# Patient Record
Sex: FEMALE | Race: WHITE | NOT HISPANIC OR LATINO | URBAN - METROPOLITAN AREA
[De-identification: names, ages, dates, MRNs, and addresses within clinical notes are randomized per-mention and may not be internally consistent; named-entity substitution may affect disease eponyms.]

---

## 2018-01-31 ENCOUNTER — EMERGENCY (EMERGENCY)
Facility: HOSPITAL | Age: 68
LOS: 0 days | Discharge: HOME | End: 2018-02-01
Admitting: FAMILY MEDICINE

## 2018-01-31 DIAGNOSIS — E87.6 HYPOKALEMIA: ICD-10-CM

## 2018-01-31 DIAGNOSIS — Z79.891 LONG TERM (CURRENT) USE OF OPIATE ANALGESIC: ICD-10-CM

## 2018-01-31 DIAGNOSIS — Z90.710 ACQUIRED ABSENCE OF BOTH CERVIX AND UTERUS: ICD-10-CM

## 2018-01-31 DIAGNOSIS — Z79.899 OTHER LONG TERM (CURRENT) DRUG THERAPY: ICD-10-CM

## 2018-01-31 DIAGNOSIS — J18.9 PNEUMONIA, UNSPECIFIED ORGANISM: ICD-10-CM

## 2018-01-31 DIAGNOSIS — Z98.890 OTHER SPECIFIED POSTPROCEDURAL STATES: ICD-10-CM

## 2018-01-31 DIAGNOSIS — I10 ESSENTIAL (PRIMARY) HYPERTENSION: ICD-10-CM

## 2018-01-31 DIAGNOSIS — R53.1 WEAKNESS: ICD-10-CM

## 2019-02-11 ENCOUNTER — OUTPATIENT (OUTPATIENT)
Dept: OUTPATIENT SERVICES | Facility: HOSPITAL | Age: 69
LOS: 1 days | Discharge: HOME | End: 2019-02-11

## 2019-02-11 VITALS
TEMPERATURE: 95 F | DIASTOLIC BLOOD PRESSURE: 66 MMHG | HEIGHT: 66 IN | HEART RATE: 71 BPM | RESPIRATION RATE: 18 BRPM | SYSTOLIC BLOOD PRESSURE: 124 MMHG | WEIGHT: 200.4 LBS | OXYGEN SATURATION: 97 %

## 2019-02-11 DIAGNOSIS — Z01.818 ENCOUNTER FOR OTHER PREPROCEDURAL EXAMINATION: ICD-10-CM

## 2019-02-11 DIAGNOSIS — Z90.710 ACQUIRED ABSENCE OF BOTH CERVIX AND UTERUS: Chronic | ICD-10-CM

## 2019-02-11 DIAGNOSIS — M54.5 LOW BACK PAIN: ICD-10-CM

## 2019-02-11 DIAGNOSIS — Z98.1 ARTHRODESIS STATUS: Chronic | ICD-10-CM

## 2019-02-11 DIAGNOSIS — Z96.653 PRESENCE OF ARTIFICIAL KNEE JOINT, BILATERAL: Chronic | ICD-10-CM

## 2019-02-11 DIAGNOSIS — Z90.49 ACQUIRED ABSENCE OF OTHER SPECIFIED PARTS OF DIGESTIVE TRACT: Chronic | ICD-10-CM

## 2019-02-11 LAB
ALBUMIN SERPL ELPH-MCNC: 4.3 G/DL — SIGNIFICANT CHANGE UP (ref 3.5–5.2)
ALP SERPL-CCNC: 84 U/L — SIGNIFICANT CHANGE UP (ref 30–115)
ALT FLD-CCNC: 17 U/L — SIGNIFICANT CHANGE UP (ref 0–41)
ANION GAP SERPL CALC-SCNC: 20 MMOL/L — HIGH (ref 7–14)
AST SERPL-CCNC: 20 U/L — SIGNIFICANT CHANGE UP (ref 0–41)
BILIRUB SERPL-MCNC: 0.3 MG/DL — SIGNIFICANT CHANGE UP (ref 0.2–1.2)
BUN SERPL-MCNC: 24 MG/DL — HIGH (ref 10–20)
CALCIUM SERPL-MCNC: 9.9 MG/DL — SIGNIFICANT CHANGE UP (ref 8.5–10.1)
CHLORIDE SERPL-SCNC: 93 MMOL/L — LOW (ref 98–110)
CO2 SERPL-SCNC: 30 MMOL/L — SIGNIFICANT CHANGE UP (ref 17–32)
CREAT SERPL-MCNC: 1 MG/DL — SIGNIFICANT CHANGE UP (ref 0.7–1.5)
GLUCOSE SERPL-MCNC: 92 MG/DL — SIGNIFICANT CHANGE UP (ref 70–99)
POTASSIUM SERPL-MCNC: 3.4 MMOL/L — LOW (ref 3.5–5)
POTASSIUM SERPL-SCNC: 3.4 MMOL/L — LOW (ref 3.5–5)
PROT SERPL-MCNC: 6.7 G/DL — SIGNIFICANT CHANGE UP (ref 6–8)
SODIUM SERPL-SCNC: 143 MMOL/L — SIGNIFICANT CHANGE UP (ref 135–146)

## 2019-02-11 NOTE — H&P PST ADULT - PSH
H/O spinal fusion  2012  H/O total knee replacement, bilateral  2010  History of cholecystectomy  2008  History of hysterectomy  2002

## 2019-02-11 NOTE — H&P PST ADULT - REASON FOR ADMISSION
68 Y/O F SCHEDULED FOR PAST FOR PERMANENT PLACEMENT OF THORACIC SPINAL CORD STIMULATOR AND IPG BATTERY PLACEMENT ON 2/15/19

## 2019-02-11 NOTE — H&P PST ADULT - HISTORY OF PRESENT ILLNESS
CURRENTLY  DENIES ANY CP, SOB,PALPITATIONS,COUGH OR DYSURIA  EXERCISE TOLERANCE 1 FOS WITHOUT SOB- uses cane for stabiltiy  chronic sinus congestion    AS PER PATIENT  this is his/her complete medical history including medications - PRESCRIPTIONS  OVER THE COUNTER MEDS

## 2019-02-15 ENCOUNTER — OUTPATIENT (OUTPATIENT)
Dept: OUTPATIENT SERVICES | Facility: HOSPITAL | Age: 69
LOS: 1 days | Discharge: HOME | End: 2019-02-15

## 2019-02-15 VITALS
HEART RATE: 97 BPM | DIASTOLIC BLOOD PRESSURE: 67 MMHG | RESPIRATION RATE: 15 BRPM | SYSTOLIC BLOOD PRESSURE: 146 MMHG | OXYGEN SATURATION: 96 %

## 2019-02-15 VITALS
RESPIRATION RATE: 140 BRPM | HEART RATE: 73 BPM | HEIGHT: 66 IN | TEMPERATURE: 98 F | SYSTOLIC BLOOD PRESSURE: 61 MMHG | WEIGHT: 190.04 LBS

## 2019-02-15 DIAGNOSIS — Z96.653 PRESENCE OF ARTIFICIAL KNEE JOINT, BILATERAL: Chronic | ICD-10-CM

## 2019-02-15 DIAGNOSIS — Z98.1 ARTHRODESIS STATUS: Chronic | ICD-10-CM

## 2019-02-15 DIAGNOSIS — M54.5 LOW BACK PAIN: ICD-10-CM

## 2019-02-15 DIAGNOSIS — Z90.49 ACQUIRED ABSENCE OF OTHER SPECIFIED PARTS OF DIGESTIVE TRACT: Chronic | ICD-10-CM

## 2019-02-15 DIAGNOSIS — Z90.710 ACQUIRED ABSENCE OF BOTH CERVIX AND UTERUS: Chronic | ICD-10-CM

## 2019-02-15 RX ORDER — HYDROMORPHONE HYDROCHLORIDE 2 MG/ML
1 INJECTION INTRAMUSCULAR; INTRAVENOUS; SUBCUTANEOUS
Qty: 0 | Refills: 0 | Status: DISCONTINUED | OUTPATIENT
Start: 2019-02-15 | End: 2019-02-16

## 2019-02-15 RX ORDER — HYDROMORPHONE HYDROCHLORIDE 2 MG/ML
0.5 INJECTION INTRAMUSCULAR; INTRAVENOUS; SUBCUTANEOUS
Qty: 0 | Refills: 0 | Status: DISCONTINUED | OUTPATIENT
Start: 2019-02-15 | End: 2019-02-16

## 2019-02-15 RX ORDER — SODIUM CHLORIDE 9 MG/ML
1000 INJECTION, SOLUTION INTRAVENOUS
Qty: 0 | Refills: 0 | Status: DISCONTINUED | OUTPATIENT
Start: 2019-02-15 | End: 2019-02-16

## 2019-02-15 RX ORDER — ONDANSETRON 8 MG/1
4 TABLET, FILM COATED ORAL ONCE
Qty: 0 | Refills: 0 | Status: DISCONTINUED | OUTPATIENT
Start: 2019-02-15 | End: 2019-02-16

## 2019-02-15 RX ORDER — MEPERIDINE HYDROCHLORIDE 50 MG/ML
12.5 INJECTION INTRAMUSCULAR; INTRAVENOUS; SUBCUTANEOUS
Qty: 0 | Refills: 0 | Status: DISCONTINUED | OUTPATIENT
Start: 2019-02-15 | End: 2019-02-16

## 2019-02-15 RX ADMIN — SODIUM CHLORIDE 100 MILLILITER(S): 9 INJECTION, SOLUTION INTRAVENOUS at 22:07

## 2019-02-15 NOTE — ASU DISCHARGE PLAN (ADULT/PEDIATRIC). - MEDICATION SUMMARY - MEDICATIONS TO TAKE
I will START or STAY ON the medications listed below when I get home from the hospital:    morphine 30 mg oral capsule  -- orally 2 times a day  -- Indication: For pain    gabapentin 300 mg oral tablet  -- orally 3 times a day  -- Indication: For nerve pain    triamterene-hydrochlorothiazide 75mg-50mg oral tablet  -- 1 tab(s) by mouth once a day  -- Indication: For HTN    Metoprolol Tartrate 25 mg oral tablet  -- orally once a day  -- Indication: For HTN

## 2019-02-15 NOTE — CHART NOTE - NSCHARTNOTEFT_GEN_A_CORE
PACU ANESTHESIA ADMISSION NOTE      Procedure:   Post op diagnosis:      ____  Intubated  TV:______       Rate: ______      FiO2: ______    __x_  Patent Airway    ____  Full return of protective reflexes    _x___  Full recovery from anesthesia / back to baseline status    Vitals:  T(F): 98.1  HR 98  BP: 140/71  RR: 14  SpO2: 100%    Mental Status:  ___x_ Awake   __x___ Alert   _____ Drowsy   _____ Sedated    Nausea/Vomiting:  __x__ NO  ______Yes,   See Post - Op Orders          Pain Scale (0-10):  _____    Treatment: ____ None    _x___ See Post - Op/PCA Orders    Post - Operative Fluids:   ____ Oral   ___x_ See Post - Op Orders    Plan: Discharge:   __x__Home       _____Floor     _____Critical Care    _____  Other:_________________    Comments: Patient tolerated procedure  No anesthesia complications  Transfer to PACU

## 2019-02-15 NOTE — PRE-ANESTHESIA EVALUATION ADULT - NSANTHADDINFOFT_GEN_ALL_CORE
R/B/A explained and accepted plan ADONAY R/B/A explained and accepted plan ADONAY  OLd record reviewed 2012 .  Glidescope view Gr2

## 2019-02-16 NOTE — BRIEF OPERATIVE NOTE - PROCEDURE
<<-----Click on this checkbox to enter Procedure Spinal cord stimulator replacement  02/16/2019  and right IPG  Active  ARAVAL1

## 2019-02-20 DIAGNOSIS — Z96.653 PRESENCE OF ARTIFICIAL KNEE JOINT, BILATERAL: ICD-10-CM

## 2019-02-20 DIAGNOSIS — M19.90 UNSPECIFIED OSTEOARTHRITIS, UNSPECIFIED SITE: ICD-10-CM

## 2019-02-20 DIAGNOSIS — M54.5 LOW BACK PAIN: ICD-10-CM

## 2019-02-20 DIAGNOSIS — I10 ESSENTIAL (PRIMARY) HYPERTENSION: ICD-10-CM

## 2022-06-20 PROBLEM — Z00.00 ENCOUNTER FOR PREVENTIVE HEALTH EXAMINATION: Status: ACTIVE | Noted: 2022-06-20

## 2022-08-11 PROBLEM — M19.90 UNSPECIFIED OSTEOARTHRITIS, UNSPECIFIED SITE: Chronic | Status: ACTIVE | Noted: 2019-02-11

## 2022-08-11 PROBLEM — I10 ESSENTIAL (PRIMARY) HYPERTENSION: Chronic | Status: ACTIVE | Noted: 2019-02-11

## 2022-08-11 PROBLEM — M54.9 DORSALGIA, UNSPECIFIED: Chronic | Status: ACTIVE | Noted: 2019-02-11

## 2022-09-13 ENCOUNTER — APPOINTMENT (OUTPATIENT)
Dept: PAIN MANAGEMENT | Facility: CLINIC | Age: 72
End: 2022-09-13

## 2022-09-13 VITALS
WEIGHT: 180 LBS | BODY MASS INDEX: 28.93 KG/M2 | HEART RATE: 86 BPM | HEIGHT: 66 IN | DIASTOLIC BLOOD PRESSURE: 67 MMHG | SYSTOLIC BLOOD PRESSURE: 102 MMHG

## 2022-09-13 DIAGNOSIS — Z86.39 PERSONAL HISTORY OF OTHER ENDOCRINE, NUTRITIONAL AND METABOLIC DISEASE: ICD-10-CM

## 2022-09-13 DIAGNOSIS — Z86.79 PERSONAL HISTORY OF OTHER DISEASES OF THE CIRCULATORY SYSTEM: ICD-10-CM

## 2022-09-13 PROCEDURE — 99213 OFFICE O/P EST LOW 20 MIN: CPT

## 2022-09-13 NOTE — DATA REVIEWED
[FreeTextEntry1] : LUMBAR X-RAY\par IMPRESSION:\par Degenerative change, L1-2 disc. Postop change, L2-S1 levels. No hardware complication. Demineralization. Degenerative change at L1-2 is new. The neurostimulator device is new.

## 2022-09-13 NOTE — HISTORY OF PRESENT ILLNESS
[FreeTextEntry1] : A continuing active encounter took place, previous history and exam reviewed.\par \par ORIGINAL PRESENTATION: Ms. Tenorio is a 72 year old woman who we have been following since 2011 for chronic low back pain associated with radiculopathy symptoms. On May 4th 2012, the patient underwent an L2-L3 and L3-L4 direct lateral interbody fusion along with an L4-L5 and L5-S1 transforaminal lumbar interbody fusion with posterior screw fixation. On Oct 30th, 2013, she had a fluoroscopically guided bilateral L5-S1 hardware block and reports about 70% relief of her pain symptoms. She underwent a hardware removal procedure in April 2014.\par \par TODAY: The reason for the visit is for a continuing active encounter. She is under our care for chronic lumbar pain. She recently recovered from Covid. She continues to use her SCS and is doing her own exercises at home.\par \par The patient continues with tizanidine and MS Contin 60 mg bid. She denies side effects from the medications. No aberrant behavior is noted.\par \par UDS, 6/9/22 - Consistent.

## 2022-09-13 NOTE — PHYSICAL EXAM
[de-identified] :  Neurologic: Her speech is clear and fluent. She answers questions appropriately.   Back, including spine: Palpation of the thoracic/lumbar spine is as follows: midline lumbar tenderness. Range of motion of the thoracic and lumbar spine is as follows: Diminished range of motion in all planes. Gait and function is as follows: patient ambulates without assistive device and mildly antalgic gait.

## 2022-09-13 NOTE — DISCUSSION/SUMMARY
[de-identified] : I have consulted the  registry for the purpose of reviewing the patient's controlled substance.\par \par Overall there is at least a 30-50% reduction in pain with the prescribed analgesics. The patient denies any adverse side effects due to the medication (sleeping disturbance, constipation, sleepiness, hallucinations and/or urination problems). \par \par There are no inconsistencies on urine toxicology screening which would necessitate an alteration of therapy.\par \par The patient will return to the office in 4 weeks time and is aware to contact me with any question or concerns in the interim.\par \par Bri Gill PA-C \par Dulce Rojas MD\par

## 2022-09-13 NOTE — ASSESSMENT
[FreeTextEntry1] : Ms. Tenorio suffers with chronic back pain. She will continue with her own exercises at home. She will continue with tizanidine and MS Contin. She will call in 4 weeks for her medication refills. I will see her back at the office in 8 weeks for reevaluation.

## 2022-11-08 ENCOUNTER — RX RENEWAL (OUTPATIENT)
Age: 72
End: 2022-11-08

## 2022-11-11 ENCOUNTER — LABORATORY RESULT (OUTPATIENT)
Age: 72
End: 2022-11-11

## 2022-11-11 ENCOUNTER — APPOINTMENT (OUTPATIENT)
Dept: PAIN MANAGEMENT | Facility: CLINIC | Age: 72
End: 2022-11-11

## 2022-11-11 VITALS — DIASTOLIC BLOOD PRESSURE: 87 MMHG | SYSTOLIC BLOOD PRESSURE: 150 MMHG | HEART RATE: 111 BPM

## 2022-11-11 LAB — OPIATES UR-MCNC: NORMAL

## 2022-11-11 PROCEDURE — 99213 OFFICE O/P EST LOW 20 MIN: CPT

## 2022-11-11 PROCEDURE — 80305 DRUG TEST PRSMV DIR OPT OBS: CPT | Mod: QW

## 2022-11-11 NOTE — ASSESSMENT
[FreeTextEntry1] : Ms. Tenorio suffers with chronic back pain. She will continue with her own exercises at home. She will start PT for her lumbar spine. She will continue with MS Contin. I have switched her to cyclobenzaprine 5 mg TID. Due to limited appointment availability, she will call in 4 weeks for her medication refills. I will see her back at the office in 8 weeks for reevaluation.

## 2022-11-11 NOTE — HISTORY OF PRESENT ILLNESS
[FreeTextEntry1] : A continuing active encounter took place, previous history and exam reviewed.\par \par ORIGINAL PRESENTATION: Ms. Tenorio is a 72 year old woman who we have been following since 2011 for chronic low back pain associated with radiculopathy symptoms. On May 4th 2012, the patient underwent an L2-L3 and L3-L4 direct lateral interbody fusion along with an L4-L5 and L5-S1 transforaminal lumbar interbody fusion with posterior screw fixation. On Oct 30th, 2013, she had a fluoroscopically guided bilateral L5-S1 hardware block and reports about 70% relief of her pain symptoms. She underwent a hardware removal procedure in April 2014.\par \par TODAY: The reason for the visit is for a continuing active encounter. She is under our care for chronic lumbar pain. She continues to use her SCS and is doing her own exercises at home. She is interested in starting PT.\par \par The patient continues with tizanidine and MS Contin 60 mg bid. She states the tizanidine has been causing her dizziness and because of this, she has been taking it very sparingly. We spoke about switching her to another muscle relaxant and she is agreeable. No aberrant behavior is noted.\par \par UDS, repeated today, 11/11/22

## 2022-11-11 NOTE — PHYSICAL EXAM
[de-identified] :  Neurologic: Her speech is clear and fluent. She answers questions appropriately.   Back, including spine: Palpation of the thoracic/lumbar spine is as follows: midline lumbar tenderness. Range of motion of the thoracic and lumbar spine is as follows: Diminished range of motion in all planes. Gait and function is as follows: patient ambulates without assistive device and mildly antalgic gait.

## 2022-11-11 NOTE — ASU PATIENT PROFILE, ADULT - BLOOD TRANSFUSION, PREVIOUS, PROFILE
ANTICOAGULATION MANAGEMENT     Renée Mcfadden 56 year old female is on warfarin with supratherapeutic INR result. (Goal INR 2.0-3.0)    Recent labs: (last 7 days)     11/11/22  0805   INR 3.5*       ASSESSMENT       Source(s): Chart Review and Patient/Caregiver Call       Warfarin doses taken: Warfarin taken as instructed    Diet: No new diet changes identified    New illness, injury, or hospitalization: No    Medication/supplement changes: None noted    Signs or symptoms of bleeding or clotting: No    Previous INR: Therapeutic last 2(+) visits    Additional findings: None       PLAN     Recommended plan for no diet, medication or health factor changes affecting INR     Dosing Instructions: partial hold then continue your current warfarin dose with next INR in 2 weeks       Summary  As of 11/11/2022    Full warfarin instructions:  11/11: 2.5 mg; Otherwise 5 mg every Mon, Wed, Fri; 7.5 mg all other days; Starting 11/11/2022   Next INR check:  11/25/2022             Telephone call with Geraldine who verbalizes understanding and agrees to plan    Lab visit scheduled    Education provided:     Goal range and lab monitoring: goal range and significance of current result    Plan made per [facility] anticoagulation protocol    Farrah Lopez RN  Anticoagulation Clinic  11/11/2022    _______________________________________________________________________     Anticoagulation Episode Summary     Current INR goal:  2.0-3.0   TTR:  69.1 % (8.1 mo)   Target end date:  Indefinite   Send INR reminders to:  DOUGIE RAVI    Indications    Personal history of DVT (deep vein thrombosis) [Z86.718]           Comments:           Anticoagulation Care Providers     Provider Role Specialty Phone number    Davidson Alegria MD Referring Family Medicine [phone]           no

## 2022-11-11 NOTE — DISCUSSION/SUMMARY
[de-identified] : I have consulted the  registry for the purpose of reviewing the patient's controlled substance.\par \par Overall there is at least a 30-50% reduction in pain with the prescribed analgesics. The patient denies any adverse side effects due to the medication (sleeping disturbance, constipation, sleepiness, hallucinations and/or urination problems). \par \par Urine drug screening collected today with rapid sample result consistent with given regimen. Sample to be sent for confirmatory testing with additional relief at a later time.\par \par The patient will return to the office in 4 weeks time and is aware to contact me with any question or concerns in the interim.\par \par Bri Gill PA-C \par Dulce Rojas MD\par

## 2023-01-12 ENCOUNTER — APPOINTMENT (OUTPATIENT)
Dept: PAIN MANAGEMENT | Facility: CLINIC | Age: 73
End: 2023-01-12
Payer: MEDICARE

## 2023-01-12 VITALS
HEIGHT: 66 IN | WEIGHT: 180 LBS | SYSTOLIC BLOOD PRESSURE: 142 MMHG | HEART RATE: 98 BPM | BODY MASS INDEX: 28.93 KG/M2 | DIASTOLIC BLOOD PRESSURE: 81 MMHG

## 2023-01-12 PROCEDURE — 99213 OFFICE O/P EST LOW 20 MIN: CPT

## 2023-01-12 NOTE — DISCUSSION/SUMMARY
[de-identified] : I have consulted the  registry for the purpose of reviewing the patient's controlled substance.\par \par Overall there is at least a 30-50% reduction in pain with the prescribed analgesics. The patient denies any adverse side effects due to the medication (sleeping disturbance, constipation, sleepiness, hallucinations and/or urination problems). \par \par There are no inconsistencies on urine toxicology screening which would necessitate an alteration of therapy.\par \par The patient will return to the office in 4 weeks time and is aware to contact me with any question or concerns in the interim.\par \par rBi Gill PA-C \par Dulce Rojas MD\par

## 2023-01-12 NOTE — ASSESSMENT
[FreeTextEntry1] : Ms. Tenorio suffers with chronic back pain. She will continue with her own exercises at home. She will start PT at [facility] for her lumbar spine. She will continue with MS Contin. I have prescribed her methocarbamol 750 mg TID prn. She will follow up in 4 weeks for reevaluation.

## 2023-01-12 NOTE — HISTORY OF PRESENT ILLNESS
[FreeTextEntry1] : A continuing active encounter took place, previous history and exam reviewed.\par \par ORIGINAL PRESENTATION: Ms. Tenorio is a 72 year old woman who we have been following since 2011 for chronic low back pain associated with radiculopathy symptoms. On May 4th 2012, the patient underwent an L2-L3 and L3-L4 direct lateral interbody fusion along with an L4-L5 and L5-S1 transforaminal lumbar interbody fusion with posterior screw fixation. On Oct 30th, 2013, she had a fluoroscopically guided bilateral L5-S1 hardware block and reports about 70% relief of her pain symptoms. She underwent a hardware removal procedure in April 2014.\par \par TODAY: The reason for the visit is for a continuing active encounter. She is under our care for chronic lumbar pain. She continues to use her SCS and is doing her own exercises at home. She did not start PT due to the holidays but plans to start in the near future.\par \par The patient continues with MS Contin 60 mg bid. She stopped taking tizanidine which was causing her dizziness. She was switched to cyclobenzaprine, which was not covered by her insurance company. I will prescribe methocarmabol for her today and see if it is covered.\par \par UDS, 11/11/22.\par SOAPP, done today, 1/12/23 - LOW RISK.

## 2023-01-12 NOTE — PHYSICAL EXAM
[de-identified] :  Neurologic: Her speech is clear and fluent. She answers questions appropriately.   Back, including spine: Palpation of the thoracic/lumbar spine is as follows: midline lumbar tenderness. Range of motion of the thoracic and lumbar spine is as follows: Diminished range of motion in all planes. Gait and function is as follows: patient ambulates without assistive device and mildly antalgic gait.

## 2023-02-14 ENCOUNTER — APPOINTMENT (OUTPATIENT)
Dept: PAIN MANAGEMENT | Facility: CLINIC | Age: 73
End: 2023-02-14
Payer: MEDICARE

## 2023-02-14 VITALS
SYSTOLIC BLOOD PRESSURE: 132 MMHG | WEIGHT: 180 LBS | HEART RATE: 85 BPM | HEIGHT: 66 IN | BODY MASS INDEX: 28.93 KG/M2 | DIASTOLIC BLOOD PRESSURE: 89 MMHG

## 2023-02-14 PROCEDURE — 99213 OFFICE O/P EST LOW 20 MIN: CPT

## 2023-02-14 RX ORDER — TIZANIDINE 4 MG/1
4 TABLET ORAL 3 TIMES DAILY
Qty: 90 | Refills: 1 | Status: DISCONTINUED | COMMUNITY
Start: 2022-09-13 | End: 2023-02-14

## 2023-02-14 RX ORDER — CYCLOBENZAPRINE HYDROCHLORIDE 5 MG/1
5 TABLET, FILM COATED ORAL 3 TIMES DAILY
Qty: 90 | Refills: 0 | Status: DISCONTINUED | COMMUNITY
Start: 2022-11-11 | End: 2023-02-14

## 2023-02-14 NOTE — DISCUSSION/SUMMARY
[de-identified] : I have consulted the  registry for the purpose of reviewing the patient's controlled substance.\par \par Overall there is at least a 30-50% reduction in pain with the prescribed analgesics. The patient denies any adverse side effects due to the medication (sleeping disturbance, constipation, sleepiness, hallucinations and/or urination problems). \par There are no inconsistencies on urine toxicology screening which would necessitate an alteration of therapy.\par The patient will return to the office in 4 weeks time and is aware to contact me with any question or concerns in the interim.\par \par Bri Gill PA-C \par Dulce Rojas MD\par

## 2023-02-14 NOTE — PHYSICAL EXAM
[de-identified] :  Neurologic: Her speech is clear and fluent. She answers questions appropriately.   Back, including spine: Palpation of the thoracic/lumbar spine is as follows: midline lumbar tenderness. Range of motion of the thoracic and lumbar spine is as follows: Diminished range of motion in all planes. Gait and function is as follows: patient ambulates without assistive device and mildly antalgic gait.

## 2023-02-14 NOTE — ASSESSMENT
[FreeTextEntry1] : Ms. Tenorio suffers with chronic back pain. Her symptoms have been relatively stable. She will continue with her own exercises at home. She will continue with MS Contin. She will follow up in 4 weeks for reevaluation.

## 2023-02-14 NOTE — HISTORY OF PRESENT ILLNESS
[FreeTextEntry1] : A continuing active encounter took place, previous history and exam reviewed.\par \par ORIGINAL PRESENTATION: Ms. Tenorio is a 73 year old woman who we have been following since 2011 for chronic low back pain associated with radiculopathy symptoms. On May 4th 2012, the patient underwent an L2-L3 and L3-L4 direct lateral interbody fusion along with an L4-L5 and L5-S1 transforaminal lumbar interbody fusion with posterior screw fixation. On Oct 30th, 2013, she had a fluoroscopically guided bilateral L5-S1 hardware block and reports about 70% relief of her pain symptoms. She underwent a hardware removal procedure in April 2014.\par \par TODAY: The reason for the visit is for a continuing active encounter. She is under our care for chronic lumbar pain. Her symptoms have remained stable over the past few weeks. She continues to use her SCS and is doing her own exercises at home. She is yet to start PT.\par \par The patient continues with MS Contin 60 mg bid. She stopped taking tizanidine which was causing her dizziness. She was switched to cyclobenzaprine, which was not covered by her insurance company. Methocarbamol was not approved either.\par \par UDS, 11/11/22.\par SOAPP, 1/12/23 - LOW RISK.

## 2023-03-05 ENCOUNTER — EMERGENCY (EMERGENCY)
Facility: HOSPITAL | Age: 73
LOS: 0 days | Discharge: HOME | End: 2023-03-05
Attending: EMERGENCY MEDICINE
Payer: MEDICARE

## 2023-03-05 VITALS
RESPIRATION RATE: 18 BRPM | DIASTOLIC BLOOD PRESSURE: 72 MMHG | TEMPERATURE: 99 F | HEART RATE: 71 BPM | SYSTOLIC BLOOD PRESSURE: 128 MMHG | OXYGEN SATURATION: 99 %

## 2023-03-05 VITALS
SYSTOLIC BLOOD PRESSURE: 134 MMHG | RESPIRATION RATE: 17 BRPM | OXYGEN SATURATION: 97 % | HEIGHT: 66 IN | WEIGHT: 169.98 LBS | DIASTOLIC BLOOD PRESSURE: 78 MMHG | TEMPERATURE: 99 F | HEART RATE: 78 BPM

## 2023-03-05 DIAGNOSIS — R30.0 DYSURIA: ICD-10-CM

## 2023-03-05 DIAGNOSIS — I10 ESSENTIAL (PRIMARY) HYPERTENSION: ICD-10-CM

## 2023-03-05 DIAGNOSIS — Z90.49 ACQUIRED ABSENCE OF OTHER SPECIFIED PARTS OF DIGESTIVE TRACT: Chronic | ICD-10-CM

## 2023-03-05 DIAGNOSIS — N12 TUBULO-INTERSTITIAL NEPHRITIS, NOT SPECIFIED AS ACUTE OR CHRONIC: ICD-10-CM

## 2023-03-05 DIAGNOSIS — Z90.49 ACQUIRED ABSENCE OF OTHER SPECIFIED PARTS OF DIGESTIVE TRACT: ICD-10-CM

## 2023-03-05 DIAGNOSIS — Z90.710 ACQUIRED ABSENCE OF BOTH CERVIX AND UTERUS: Chronic | ICD-10-CM

## 2023-03-05 DIAGNOSIS — Z96.653 PRESENCE OF ARTIFICIAL KNEE JOINT, BILATERAL: Chronic | ICD-10-CM

## 2023-03-05 DIAGNOSIS — N17.9 ACUTE KIDNEY FAILURE, UNSPECIFIED: ICD-10-CM

## 2023-03-05 DIAGNOSIS — Z96.653 PRESENCE OF ARTIFICIAL KNEE JOINT, BILATERAL: ICD-10-CM

## 2023-03-05 DIAGNOSIS — M54.9 DORSALGIA, UNSPECIFIED: ICD-10-CM

## 2023-03-05 DIAGNOSIS — Z88.1 ALLERGY STATUS TO OTHER ANTIBIOTIC AGENTS STATUS: ICD-10-CM

## 2023-03-05 DIAGNOSIS — Z90.710 ACQUIRED ABSENCE OF BOTH CERVIX AND UTERUS: ICD-10-CM

## 2023-03-05 DIAGNOSIS — M19.90 UNSPECIFIED OSTEOARTHRITIS, UNSPECIFIED SITE: ICD-10-CM

## 2023-03-05 DIAGNOSIS — Z98.1 ARTHRODESIS STATUS: Chronic | ICD-10-CM

## 2023-03-05 LAB
ALBUMIN SERPL ELPH-MCNC: 3.3 G/DL — LOW (ref 3.5–5.2)
ALP SERPL-CCNC: 132 U/L — HIGH (ref 30–115)
ALT FLD-CCNC: 27 U/L — SIGNIFICANT CHANGE UP (ref 0–41)
ANION GAP SERPL CALC-SCNC: 14 MMOL/L — SIGNIFICANT CHANGE UP (ref 7–14)
APPEARANCE UR: CLEAR — SIGNIFICANT CHANGE UP
AST SERPL-CCNC: 62 U/L — HIGH (ref 0–41)
BACTERIA # UR AUTO: ABNORMAL
BASOPHILS # BLD AUTO: 0.08 K/UL — SIGNIFICANT CHANGE UP (ref 0–0.2)
BASOPHILS NFR BLD AUTO: 0.7 % — SIGNIFICANT CHANGE UP (ref 0–1)
BILIRUB SERPL-MCNC: 1.1 MG/DL — SIGNIFICANT CHANGE UP (ref 0.2–1.2)
BILIRUB UR-MCNC: NEGATIVE — SIGNIFICANT CHANGE UP
BUN SERPL-MCNC: 35 MG/DL — HIGH (ref 10–20)
CALCIUM SERPL-MCNC: 9.2 MG/DL — SIGNIFICANT CHANGE UP (ref 8.4–10.5)
CHLORIDE SERPL-SCNC: 81 MMOL/L — LOW (ref 98–110)
CO2 SERPL-SCNC: 34 MMOL/L — HIGH (ref 17–32)
COLOR SPEC: YELLOW — SIGNIFICANT CHANGE UP
CREAT SERPL-MCNC: 1.8 MG/DL — HIGH (ref 0.7–1.5)
DIFF PNL FLD: ABNORMAL
EGFR: 29 ML/MIN/1.73M2 — LOW
EOSINOPHIL # BLD AUTO: 0.03 K/UL — SIGNIFICANT CHANGE UP (ref 0–0.7)
EOSINOPHIL NFR BLD AUTO: 0.3 % — SIGNIFICANT CHANGE UP (ref 0–8)
EPI CELLS # UR: ABNORMAL /HPF
GLUCOSE SERPL-MCNC: 123 MG/DL — HIGH (ref 70–99)
GLUCOSE UR QL: NEGATIVE MG/DL — SIGNIFICANT CHANGE UP
HCT VFR BLD CALC: 46 % — SIGNIFICANT CHANGE UP (ref 37–47)
HGB BLD-MCNC: 15.3 G/DL — SIGNIFICANT CHANGE UP (ref 12–16)
IMM GRANULOCYTES NFR BLD AUTO: 0.5 % — HIGH (ref 0.1–0.3)
KETONES UR-MCNC: NEGATIVE — SIGNIFICANT CHANGE UP
LEUKOCYTE ESTERASE UR-ACNC: ABNORMAL
LYMPHOCYTES # BLD AUTO: 1.08 K/UL — LOW (ref 1.2–3.4)
LYMPHOCYTES # BLD AUTO: 9.2 % — LOW (ref 20.5–51.1)
MCHC RBC-ENTMCNC: 28.4 PG — SIGNIFICANT CHANGE UP (ref 27–31)
MCHC RBC-ENTMCNC: 33.3 G/DL — SIGNIFICANT CHANGE UP (ref 32–37)
MCV RBC AUTO: 85.3 FL — SIGNIFICANT CHANGE UP (ref 81–99)
MONOCYTES # BLD AUTO: 1.27 K/UL — HIGH (ref 0.1–0.6)
MONOCYTES NFR BLD AUTO: 10.8 % — HIGH (ref 1.7–9.3)
NEUTROPHILS # BLD AUTO: 9.27 K/UL — HIGH (ref 1.4–6.5)
NEUTROPHILS NFR BLD AUTO: 78.5 % — HIGH (ref 42.2–75.2)
NITRITE UR-MCNC: POSITIVE
NRBC # BLD: 0 /100 WBCS — SIGNIFICANT CHANGE UP (ref 0–0)
PH UR: 7 — SIGNIFICANT CHANGE UP (ref 5–8)
PLATELET # BLD AUTO: 122 K/UL — LOW (ref 130–400)
POTASSIUM SERPL-MCNC: 3.8 MMOL/L — SIGNIFICANT CHANGE UP (ref 3.5–5)
POTASSIUM SERPL-SCNC: 3.8 MMOL/L — SIGNIFICANT CHANGE UP (ref 3.5–5)
PROT SERPL-MCNC: 6.4 G/DL — SIGNIFICANT CHANGE UP (ref 6–8)
PROT UR-MCNC: 100 MG/DL
RBC # BLD: 5.39 M/UL — SIGNIFICANT CHANGE UP (ref 4.2–5.4)
RBC # FLD: 12.7 % — SIGNIFICANT CHANGE UP (ref 11.5–14.5)
RBC CASTS # UR COMP ASSIST: SIGNIFICANT CHANGE UP /HPF
SODIUM SERPL-SCNC: 129 MMOL/L — LOW (ref 135–146)
SP GR SPEC: 1.01 — SIGNIFICANT CHANGE UP (ref 1.01–1.03)
UROBILINOGEN FLD QL: 2 MG/DL
WBC # BLD: 11.79 K/UL — HIGH (ref 4.8–10.8)
WBC # FLD AUTO: 11.79 K/UL — HIGH (ref 4.8–10.8)
WBC UR QL: ABNORMAL /HPF

## 2023-03-05 PROCEDURE — 36415 COLL VENOUS BLD VENIPUNCTURE: CPT

## 2023-03-05 PROCEDURE — 87086 URINE CULTURE/COLONY COUNT: CPT

## 2023-03-05 PROCEDURE — 74176 CT ABD & PELVIS W/O CONTRAST: CPT | Mod: 26,MA

## 2023-03-05 PROCEDURE — 80053 COMPREHEN METABOLIC PANEL: CPT

## 2023-03-05 PROCEDURE — 85025 COMPLETE CBC W/AUTO DIFF WBC: CPT

## 2023-03-05 PROCEDURE — 74176 CT ABD & PELVIS W/O CONTRAST: CPT | Mod: MA

## 2023-03-05 PROCEDURE — 81001 URINALYSIS AUTO W/SCOPE: CPT

## 2023-03-05 PROCEDURE — 99284 EMERGENCY DEPT VISIT MOD MDM: CPT

## 2023-03-05 PROCEDURE — 99284 EMERGENCY DEPT VISIT MOD MDM: CPT | Mod: 25

## 2023-03-05 RX ORDER — SACCHAROMYCES BOULARDII 250 MG
1 POWDER IN PACKET (EA) ORAL
Qty: 20 | Refills: 0
Start: 2023-03-05 | End: 2023-03-14

## 2023-03-05 RX ORDER — CEFPODOXIME PROXETIL 100 MG
1 TABLET ORAL
Qty: 20 | Refills: 0
Start: 2023-03-05 | End: 2023-03-14

## 2023-03-05 RX ORDER — CEFPODOXIME PROXETIL 100 MG
200 TABLET ORAL ONCE
Refills: 0 | Status: COMPLETED | OUTPATIENT
Start: 2023-03-05 | End: 2023-03-05

## 2023-03-05 RX ORDER — SODIUM CHLORIDE 9 MG/ML
1000 INJECTION INTRAMUSCULAR; INTRAVENOUS; SUBCUTANEOUS ONCE
Refills: 0 | Status: COMPLETED | OUTPATIENT
Start: 2023-03-05 | End: 2023-03-05

## 2023-03-05 RX ADMIN — SODIUM CHLORIDE 1000 MILLILITER(S): 9 INJECTION INTRAMUSCULAR; INTRAVENOUS; SUBCUTANEOUS at 18:56

## 2023-03-05 RX ADMIN — Medication 200 MILLIGRAM(S): at 22:32

## 2023-03-05 NOTE — ED ADULT NURSE NOTE - NSICDXPASTSURGICALHX_GEN_ALL_CORE_FT
PAST SURGICAL HISTORY:  H/O spinal fusion 2012    H/O total knee replacement, bilateral 2010    History of cholecystectomy 2008    History of hysterectomy 2002

## 2023-03-05 NOTE — ED PROVIDER NOTE - OBJECTIVE STATEMENT
72 y/o female with hx of HTN, hyperchol, arthritis presents to the ED for evaluation of dysuria x 3 days. patient c/o left sided back pain today unchanged with movement. no fevers. patient with decreased po intake with nausea over past few days. no diarrhea. patient recently on amox for sinus infection. no cp, palpitations, calf pain , rash or dizziness.

## 2023-03-05 NOTE — ED PROVIDER NOTE - CLINICAL SUMMARY MEDICAL DECISION MAKING FREE TEXT BOX
Labs and EKG were ordered and reviewed.  Imaging was ordered and reviewed by me.  Appropriate medications for patient's presenting complaints were ordered and effects were reassessed.  Patient's records (prior hospital, ED visit, and/or nursing home notes if available) were reviewed.  Additional history was obtained from EMS, family, and/or PCP (where available).  Escalation to admission/observation was considered. However, patient is very well appearing with normal vital signs and no evidence of elevated lactate.  She has reliable close follow up with Dr. Schaffer.  Patient will call in AM to schedule reassessment this week for repeat lab work to ensure improving.  Any new or worsening, she knows to immediately return to ED.

## 2023-03-05 NOTE — ED PROVIDER NOTE - PHYSICAL EXAMINATION
Vital Signs: I have reviewed the initial vital signs.  Constitutional: well-nourished, no acute distress, normocephalic  Eyes: PERRLA, EOMI, clear conjunctiva  ENT: dry mucous membranes  Cardiovascular: regular rate, regular rhythm, no murmur appreciated  Respiratory: unlabored respiratory effort, clear to auscultation bilaterally  Gastrointestinal: soft, non-tender, non-distended  abdomen, no cva tenderness  Musculoskeletal: supple neck, no lower extremity edema, no bony tenderness  Integumentary: warm, dry, no rash  Neurologic: awake, alert, cranial nerves II-XII grossly intact, extremities’ motor and sensory functions grossly intact, no focal deficits

## 2023-03-05 NOTE — ED PROVIDER NOTE - CARE PROVIDER_API CALL
Yong Schaffer)  Family Medicine  [address] [place] [address]  Phone: [phone]  Fax: [phone]  Follow Up Time:

## 2023-03-05 NOTE — ED PROVIDER NOTE - PATIENT PORTAL LINK FT
You can access the FollowMyHealth Patient Portal offered by [facility] by registering at the following website: [email or web address]. By joining [employer]’s FollowMyHealth portal, you will also be able to view your health information using other applications (apps) compatible with our system.

## 2023-03-07 LAB
CULTURE RESULTS: SIGNIFICANT CHANGE UP
SPECIMEN SOURCE: SIGNIFICANT CHANGE UP

## 2023-03-15 ENCOUNTER — APPOINTMENT (OUTPATIENT)
Dept: PAIN MANAGEMENT | Facility: CLINIC | Age: 73
End: 2023-03-15

## 2023-03-16 ENCOUNTER — APPOINTMENT (OUTPATIENT)
Dept: PAIN MANAGEMENT | Facility: CLINIC | Age: 73
End: 2023-03-16
Payer: MEDICARE

## 2023-03-16 ENCOUNTER — LABORATORY RESULT (OUTPATIENT)
Age: 73
End: 2023-03-16

## 2023-03-16 VITALS
SYSTOLIC BLOOD PRESSURE: 132 MMHG | HEART RATE: 85 BPM | HEIGHT: 66 IN | BODY MASS INDEX: 28.93 KG/M2 | WEIGHT: 180 LBS | DIASTOLIC BLOOD PRESSURE: 89 MMHG

## 2023-03-16 LAB
AMP / AMPHETAMINE: NEGATIVE
BAR / SECOBARBITAL: NEGATIVE
BUP / BUPRENORPHINE: NEGATIVE
BZO / OXAZEPAM: NEGATIVE
COC / COCAINE: NEGATIVE
CREATININE: 50 MG/DL
MDMA / METHYLENEDIOXYMETHAMPHETAMINE: NEGATIVE
MET / METHAMPHETAMINES: NEGATIVE
MOP / MORPHINE: POSITIVE
MTD / METHADONE: NEGATIVE
OXY / OXYCODONE: NEGATIVE
PCP / PHENCYCLIDINE: NEGATIVE
PH: 7
SPECIFIC GRAVITY: 1.01
TEMPERATURE: 92 C
THC / MARIJUANA: NEGATIVE

## 2023-03-16 PROCEDURE — 80305 DRUG TEST PRSMV DIR OPT OBS: CPT | Mod: QW

## 2023-03-16 PROCEDURE — 99213 OFFICE O/P EST LOW 20 MIN: CPT

## 2023-03-16 NOTE — HISTORY OF PRESENT ILLNESS
[FreeTextEntry1] : A continuing active encounter took place, previous history and exam reviewed.\par \par ORIGINAL PRESENTATION: Ms. Tenorio is a 73 year old woman who we have been following since 2011 for chronic low back pain associated with radiculopathy symptoms. On May 4th 2012, the patient underwent an L2-L3 and L3-L4 direct lateral interbody fusion along with an L4-L5 and L5-S1 transforaminal lumbar interbody fusion with posterior screw fixation. On Oct 30th, 2013, she had a fluoroscopically guided bilateral L5-S1 hardware block and reports about 70% relief of her pain symptoms. She underwent a hardware removal procedure in April 2014.\par \par TODAY: Last seen on 02/14/2023. The reason for the visit is for a continuing active encounter. She is under our care for chronic lumbar pain. Her symptoms have remained stable over the past few weeks. She continues to use her SCS and is doing her own exercises at home. She is yet to start PT.\par \par The patient continues with MS Contin 60 mg BID and Methocarbomol. \par \par UDS will be repeated today.\par SOAPP, 1/12/23 - LOW RISK.

## 2023-03-16 NOTE — PHYSICAL EXAM
[] : diminished ROM in all planes [Flexion] : flexion [Extension] : extension [de-identified] :  Neurologic: Her speech is clear and fluent. She answers questions appropriately.   Back, including spine: Palpation of the thoracic/lumbar spine is as follows: midline lumbar tenderness. Range of motion of the thoracic and lumbar spine is as follows: Diminished range of motion in all planes. Gait and function is as follows: patient ambulates without assistive device and mildly antalgic gait.

## 2023-03-16 NOTE — DISCUSSION/SUMMARY
[Medication Risks Reviewed] : Medication risks reviewed [de-identified] : I have consulted the  registry for the purpose of reviewing the patient's controlled substance.\par \par Overall there is at least a 30-50% reduction in pain with the prescribed analgesics. The patient denies any adverse side effects due to the medication (sleeping disturbance, constipation, sleepiness, hallucinations and/or urination problems). \par There are no inconsistencies on urine toxicology screening which would necessitate an alteration of therapy.\par The patient will return to the office in 4 weeks time and is aware to contact me with any question or concerns in the interim.\par \par Dl Suárez PA-C \par Dulce Rojas MD\par

## 2023-03-21 ENCOUNTER — EMERGENCY (EMERGENCY)
Facility: HOSPITAL | Age: 73
LOS: 0 days | Discharge: HOME | End: 2023-03-21
Attending: EMERGENCY MEDICINE
Payer: MEDICARE

## 2023-03-21 VITALS
DIASTOLIC BLOOD PRESSURE: 66 MMHG | WEIGHT: 169.98 LBS | HEIGHT: 66 IN | RESPIRATION RATE: 20 BRPM | TEMPERATURE: 98 F | SYSTOLIC BLOOD PRESSURE: 156 MMHG | OXYGEN SATURATION: 98 % | HEART RATE: 85 BPM

## 2023-03-21 VITALS
DIASTOLIC BLOOD PRESSURE: 66 MMHG | RESPIRATION RATE: 18 BRPM | HEART RATE: 81 BPM | OXYGEN SATURATION: 97 % | TEMPERATURE: 97 F | SYSTOLIC BLOOD PRESSURE: 142 MMHG

## 2023-03-21 DIAGNOSIS — Z20.822 CONTACT WITH AND (SUSPECTED) EXPOSURE TO COVID-19: ICD-10-CM

## 2023-03-21 DIAGNOSIS — J32.9 CHRONIC SINUSITIS, UNSPECIFIED: ICD-10-CM

## 2023-03-21 DIAGNOSIS — Z98.1 ARTHRODESIS STATUS: Chronic | ICD-10-CM

## 2023-03-21 DIAGNOSIS — N39.0 URINARY TRACT INFECTION, SITE NOT SPECIFIED: ICD-10-CM

## 2023-03-21 DIAGNOSIS — E11.9 TYPE 2 DIABETES MELLITUS WITHOUT COMPLICATIONS: ICD-10-CM

## 2023-03-21 DIAGNOSIS — Z96.653 PRESENCE OF ARTIFICIAL KNEE JOINT, BILATERAL: Chronic | ICD-10-CM

## 2023-03-21 DIAGNOSIS — Z88.1 ALLERGY STATUS TO OTHER ANTIBIOTIC AGENTS STATUS: ICD-10-CM

## 2023-03-21 DIAGNOSIS — J34.89 OTHER SPECIFIED DISORDERS OF NOSE AND NASAL SINUSES: ICD-10-CM

## 2023-03-21 DIAGNOSIS — R30.0 DYSURIA: ICD-10-CM

## 2023-03-21 DIAGNOSIS — Z90.710 ACQUIRED ABSENCE OF BOTH CERVIX AND UTERUS: Chronic | ICD-10-CM

## 2023-03-21 DIAGNOSIS — Z90.49 ACQUIRED ABSENCE OF OTHER SPECIFIED PARTS OF DIGESTIVE TRACT: Chronic | ICD-10-CM

## 2023-03-21 DIAGNOSIS — I10 ESSENTIAL (PRIMARY) HYPERTENSION: ICD-10-CM

## 2023-03-21 LAB
ALBUMIN SERPL ELPH-MCNC: 3.6 G/DL — SIGNIFICANT CHANGE UP (ref 3.5–5.2)
ALP SERPL-CCNC: 83 U/L — SIGNIFICANT CHANGE UP (ref 30–115)
ALT FLD-CCNC: 14 U/L — SIGNIFICANT CHANGE UP (ref 0–41)
ANION GAP SERPL CALC-SCNC: 6 MMOL/L — LOW (ref 7–14)
APPEARANCE UR: CLEAR — SIGNIFICANT CHANGE UP
AST SERPL-CCNC: 43 U/L — HIGH (ref 0–41)
BACTERIA # UR AUTO: ABNORMAL
BASOPHILS # BLD AUTO: 0.12 K/UL — SIGNIFICANT CHANGE UP (ref 0–0.2)
BASOPHILS NFR BLD AUTO: 1.3 % — HIGH (ref 0–1)
BILIRUB DIRECT SERPL-MCNC: <0.2 MG/DL — SIGNIFICANT CHANGE UP (ref 0–0.3)
BILIRUB INDIRECT FLD-MCNC: >0.2 MG/DL — SIGNIFICANT CHANGE UP (ref 0.2–1.2)
BILIRUB SERPL-MCNC: 0.4 MG/DL — SIGNIFICANT CHANGE UP (ref 0.2–1.2)
BILIRUB UR-MCNC: NEGATIVE — SIGNIFICANT CHANGE UP
BUN SERPL-MCNC: 12 MG/DL — SIGNIFICANT CHANGE UP (ref 10–20)
CALCIUM SERPL-MCNC: 9 MG/DL — SIGNIFICANT CHANGE UP (ref 8.4–10.5)
CHLORIDE SERPL-SCNC: 95 MMOL/L — LOW (ref 98–110)
CO2 SERPL-SCNC: 29 MMOL/L — SIGNIFICANT CHANGE UP (ref 17–32)
COLOR SPEC: ABNORMAL
CREAT SERPL-MCNC: 0.9 MG/DL — SIGNIFICANT CHANGE UP (ref 0.7–1.5)
DIFF PNL FLD: ABNORMAL
EGFR: 67 ML/MIN/1.73M2 — SIGNIFICANT CHANGE UP
EOSINOPHIL # BLD AUTO: 0.11 K/UL — SIGNIFICANT CHANGE UP (ref 0–0.7)
EOSINOPHIL NFR BLD AUTO: 1.2 % — SIGNIFICANT CHANGE UP (ref 0–8)
EPI CELLS # UR: ABNORMAL /HPF
GLUCOSE SERPL-MCNC: 88 MG/DL — SIGNIFICANT CHANGE UP (ref 70–99)
GLUCOSE UR QL: NEGATIVE MG/DL — SIGNIFICANT CHANGE UP
HCT VFR BLD CALC: 42.1 % — SIGNIFICANT CHANGE UP (ref 37–47)
HGB BLD-MCNC: 13.7 G/DL — SIGNIFICANT CHANGE UP (ref 12–16)
IMM GRANULOCYTES NFR BLD AUTO: 0.3 % — SIGNIFICANT CHANGE UP (ref 0.1–0.3)
KETONES UR-MCNC: NEGATIVE — SIGNIFICANT CHANGE UP
LACTATE SERPL-SCNC: 1.4 MMOL/L — SIGNIFICANT CHANGE UP (ref 0.7–2)
LEUKOCYTE ESTERASE UR-ACNC: ABNORMAL
LIDOCAIN IGE QN: 24 U/L — SIGNIFICANT CHANGE UP (ref 7–60)
LYMPHOCYTES # BLD AUTO: 2.19 K/UL — SIGNIFICANT CHANGE UP (ref 1.2–3.4)
LYMPHOCYTES # BLD AUTO: 23.4 % — SIGNIFICANT CHANGE UP (ref 20.5–51.1)
MCHC RBC-ENTMCNC: 28.7 PG — SIGNIFICANT CHANGE UP (ref 27–31)
MCHC RBC-ENTMCNC: 32.5 G/DL — SIGNIFICANT CHANGE UP (ref 32–37)
MCV RBC AUTO: 88.1 FL — SIGNIFICANT CHANGE UP (ref 81–99)
MONOCYTES # BLD AUTO: 0.76 K/UL — HIGH (ref 0.1–0.6)
MONOCYTES NFR BLD AUTO: 8.1 % — SIGNIFICANT CHANGE UP (ref 1.7–9.3)
NEUTROPHILS # BLD AUTO: 6.16 K/UL — SIGNIFICANT CHANGE UP (ref 1.4–6.5)
NEUTROPHILS NFR BLD AUTO: 65.7 % — SIGNIFICANT CHANGE UP (ref 42.2–75.2)
NITRITE UR-MCNC: POSITIVE
NRBC # BLD: 0 /100 WBCS — SIGNIFICANT CHANGE UP (ref 0–0)
PH UR: 7 — SIGNIFICANT CHANGE UP (ref 5–8)
PLATELET # BLD AUTO: 252 K/UL — SIGNIFICANT CHANGE UP (ref 130–400)
POTASSIUM SERPL-MCNC: 4.9 MMOL/L — SIGNIFICANT CHANGE UP (ref 3.5–5)
POTASSIUM SERPL-SCNC: 4.9 MMOL/L — SIGNIFICANT CHANGE UP (ref 3.5–5)
PROT SERPL-MCNC: 6.3 G/DL — SIGNIFICANT CHANGE UP (ref 6–8)
PROT UR-MCNC: 30 MG/DL
RBC # BLD: 4.78 M/UL — SIGNIFICANT CHANGE UP (ref 4.2–5.4)
RBC # FLD: 13 % — SIGNIFICANT CHANGE UP (ref 11.5–14.5)
RBC CASTS # UR COMP ASSIST: SIGNIFICANT CHANGE UP /HPF
SARS-COV-2 RNA SPEC QL NAA+PROBE: SIGNIFICANT CHANGE UP
SODIUM SERPL-SCNC: 130 MMOL/L — LOW (ref 135–146)
SP GR SPEC: 1.02 — SIGNIFICANT CHANGE UP (ref 1.01–1.03)
UROBILINOGEN FLD QL: 0.2 MG/DL — SIGNIFICANT CHANGE UP
WBC # BLD: 9.37 K/UL — SIGNIFICANT CHANGE UP (ref 4.8–10.8)
WBC # FLD AUTO: 9.37 K/UL — SIGNIFICANT CHANGE UP (ref 4.8–10.8)
WBC UR QL: ABNORMAL /HPF

## 2023-03-21 PROCEDURE — 87635 SARS-COV-2 COVID-19 AMP PRB: CPT

## 2023-03-21 PROCEDURE — 99284 EMERGENCY DEPT VISIT MOD MDM: CPT

## 2023-03-21 PROCEDURE — 87086 URINE CULTURE/COLONY COUNT: CPT

## 2023-03-21 PROCEDURE — 85025 COMPLETE CBC W/AUTO DIFF WBC: CPT

## 2023-03-21 PROCEDURE — 70450 CT HEAD/BRAIN W/O DYE: CPT | Mod: MG

## 2023-03-21 PROCEDURE — 80076 HEPATIC FUNCTION PANEL: CPT

## 2023-03-21 PROCEDURE — 83605 ASSAY OF LACTIC ACID: CPT

## 2023-03-21 PROCEDURE — 81001 URINALYSIS AUTO W/SCOPE: CPT

## 2023-03-21 PROCEDURE — 70450 CT HEAD/BRAIN W/O DYE: CPT | Mod: 26,MG

## 2023-03-21 PROCEDURE — G1004: CPT

## 2023-03-21 PROCEDURE — 83690 ASSAY OF LIPASE: CPT

## 2023-03-21 PROCEDURE — 80048 BASIC METABOLIC PNL TOTAL CA: CPT

## 2023-03-21 PROCEDURE — 36415 COLL VENOUS BLD VENIPUNCTURE: CPT

## 2023-03-21 PROCEDURE — 99284 EMERGENCY DEPT VISIT MOD MDM: CPT | Mod: 25

## 2023-03-21 PROCEDURE — 87186 SC STD MICRODIL/AGAR DIL: CPT

## 2023-03-21 RX ORDER — SODIUM CHLORIDE 9 MG/ML
1000 INJECTION INTRAMUSCULAR; INTRAVENOUS; SUBCUTANEOUS ONCE
Refills: 0 | Status: COMPLETED | OUTPATIENT
Start: 2023-03-21 | End: 2023-03-21

## 2023-03-21 RX ADMIN — SODIUM CHLORIDE 1000 MILLILITER(S): 9 INJECTION INTRAMUSCULAR; INTRAVENOUS; SUBCUTANEOUS at 19:45

## 2023-03-21 NOTE — ED PROVIDER NOTE - ATTENDING APP SHARED VISIT CONTRIBUTION OF CARE
I have personally performed a history and physical exam on this patient and personally directed the management of the patient. Patient is a 73-year-old female past medical history of diabetes hypertension presents for evaluation of combination of dysuria and sinus pressure states that the symptoms have been present for months seen by PMD as well as ENT recently diagnosed with urinary tract infection approximately 1 month ago completed course cefpodoxime with relief states that "they give me antibiotics my symptoms go away" patient denies any other headache visual changes chest pain shortness of breath abdominal pain or back pain she denies any nausea vomiting or diaphoresis    On physical exam patient is normocephalic atraumatic pupils equally round reactive light accommodation extraocular muscles intact oropharynx clear chest clear to auscultation bilaterally abdomen soft nontender nondistended bowel sounds positive extremities full range of motion pedal pulses 2+ radial pulse 2+    Assessment plan patient presents for evaluation of sinus pressure onset over the past several months patient denies any visual changes or headache she has normal neuro exam she is afebrile less likely infection at this cause however given time course as well as visit to her PMD and ENT I obtained a CT scan which is negative in addition we obtained a urinalysis which reveals elevation in white blood cell count comparing to prior on March 5 and this past year white blood cells are increasing in the urine we have initiated p.o. Augmentin and advised patient to follow-up in the next 24 to 48 hours with her PMD we discussed indications to return at this time given the patient's time course less likely central infection less likely vascular event

## 2023-03-21 NOTE — ED ADULT TRIAGE NOTE - CHIEF COMPLAINT QUOTE
BIBA from home as per EMS pt had finished antibiotic for UTI started to feel dizzy and nauseous since Sunday

## 2023-03-21 NOTE — ED PROVIDER NOTE - CLINICAL SUMMARY MEDICAL DECISION MAKING FREE TEXT BOX
patient presents for evaluation of sinus pressure onset over the past several months patient denies any visual changes or headache she has normal neuro exam she is afebrile less likely infection at this cause however given time course as well as visit to her PMD and ENT I obtained a CT scan which is negative in addition we obtained a urinalysis which reveals elevation in white blood cell count comparing to prior on March 5 and this past year white blood cells are increasing in the urine we have initiated p.o. Augmentin and advised patient to follow-up in the next 24 to 48 hours with her PMD we discussed indications to return at this time given the patient's time course less likely central infection less likely vascular event

## 2023-03-21 NOTE — ED PROVIDER NOTE - OBJECTIVE STATEMENT
Patient is a 73-year-old female past history of diabetes, hypertension here for evaluation of dysuria and sinus pressure.  Patient was recently diagnosed with UTI approximately 1 month ago and started on cefpodoxime with relief.  Patient denies fever, chills, nausea, vomiting.

## 2023-03-21 NOTE — ED PROVIDER NOTE - PATIENT PORTAL LINK FT
You can access the FollowMyHealth Patient Portal offered by [facility] by registering at the following website: [email or web address]. By joining [employer]’s FollowMyHealth portal, you will also be able to view your health information using other applications (apps) compatible with our system.

## 2023-03-23 LAB
PM 6 MAM: NEGATIVE NG/ML
PM 7-AMINO-CLONAZ: NEGATIVE NG/ML
PM ALPHA-HYDROXY-ALPRAZOLAM: NEGATIVE NG/ML
PM ALPHA-HYDROXY-MIDAZOLAM: NEGATIVE NG/ML
PM ALPRAZOLAM: NEGATIVE NG/ML
PM AMOBARBITAL: NEGATIVE NG/ML
PM AMPHETAMINE INTERP: NEGATIVE
PM AMPHETAMINE: NEGATIVE NG/ML
PM BARBURATES INTERP: NEGATIVE
PM BEG: NEGATIVE NG/ML
PM BENZODIAZEPINES INTERP: NEGATIVE
PM BUPRENORPHINE INTERP: NEGATIVE
PM BUPRENORPHINE: NEGATIVE NG/ML
PM BUTALBITAL: NEGATIVE NG/ML
PM CLONAZEPAM: NEGATIVE NG/ML
PM COCAINE INTERP: NEGATIVE
PM COCAINE: NEGATIVE NG/ML
PM CODIENE: NEGATIVE NG/ML
PM COTININE: NEGATIVE NG/ML
PM DIAZEPAM: NEGATIVE NG/ML
PM DIHYROCODEINE: NEGATIVE NG/ML
PM EDDP: NEGATIVE NG/ML
PM FENTANYL INTERP: NEGATIVE
PM FENTANYL: NEGATIVE NG/ML
PM FLUNITRAZEPAM: NEGATIVE NG/ML
PM FLURAZEPAM: NEGATIVE NG/ML
PM HYDROCODONE: NEGATIVE NG/ML
PM HYDROMORPHONE: NEGATIVE NG/ML
PM LORAZEPAM: NEGATIVE NG/ML
PM MARIJUANA (DELTA-9-THC): NEGATIVE NG/ML
PM MARIJUANA INTERP: NEGATIVE
PM MDA: NEGATIVE NG/ML
PM MDEA: NEGATIVE NG/ML
PM MDMA: NEGATIVE NG/ML
PM MEPERIDINE: NEGATIVE NG/ML
PM METHADONE INTERP: NEGATIVE
PM METHADONE: NEGATIVE NG/ML
PM METHAMPHETAMINE: NEGATIVE NG/ML
PM MIDAZOLAM: NEGATIVE NG/ML
PM MORPHINE: >1000 NG/ML
PM NALOXONE: NEGATIVE NG/ML
PM NALTREXONE: NEGATIVE NG/ML
PM NICOTINE INTERP: NEGATIVE
PM NORBUPRENORPHINE: NEGATIVE NG/ML
PM NORDIAZEPAM: NEGATIVE NG/ML
PM NORMEPERIDINE: NEGATIVE NG/ML
PM NOROXYCODONE: NEGATIVE NG/ML
PM OPIOID INTERP: POSITIVE
PM OXAZEPAM: NEGATIVE NG/ML
PM OXXYCODONE INTERP: NEGATIVE
PM OXYCODONE: NEGATIVE NG/ML
PM OXYMORPHONE: NEGATIVE NG/ML
PM PCP: NEGATIVE NG/ML
PM PHENCYCLIDINE INTERP: NEGATIVE
PM PHENOBARBITAL: NEGATIVE NG/ML
PM PPX: NEGATIVE NG/ML
PM PROPOXYPHENE INTERP: NEGATIVE
PM SECOBARBITAL: NEGATIVE NG/ML
PM SUFENTANIL: NEGATIVE NG/ML
PM TAPENTADOL: NEGATIVE NG/ML
PM TEMAZEPAM: NEGATIVE NG/ML
PM TRAMADOL INTERP: NEGATIVE
PM TRAMADOL: NEGATIVE NG/ML

## 2023-04-06 ENCOUNTER — APPOINTMENT (OUTPATIENT)
Dept: PAIN MANAGEMENT | Facility: CLINIC | Age: 73
End: 2023-04-06
Payer: MEDICARE

## 2023-04-06 VITALS — HEIGHT: 66 IN | BODY MASS INDEX: 28.93 KG/M2 | WEIGHT: 180 LBS

## 2023-04-06 PROCEDURE — 99213 OFFICE O/P EST LOW 20 MIN: CPT

## 2023-04-06 NOTE — HISTORY OF PRESENT ILLNESS
[FreeTextEntry1] : A continuing active encounter took place, previous history and exam reviewed.\par \par ORIGINAL PRESENTATION: Ms. Tenorio is a 73 year old woman who we have been following since 2011 for chronic low back pain associated with radiculopathy symptoms. On May 4th 2012, the patient underwent an L2-L3 and L3-L4 direct lateral interbody fusion along with an L4-L5 and L5-S1 transforaminal lumbar interbody fusion with posterior screw fixation. On Oct 30th, 2013, she had a fluoroscopically guided bilateral L5-S1 hardware block and reports about 70% relief of her pain symptoms. She underwent a hardware removal procedure in April 2014.\par \par TODAY: Last seen on 03/16/2023. The reason for the visit is for a continuing active encounter. She is under our care for chronic lumbar pain. Her symptoms have remained unchanged since her last visit with no acute exacerbations or flares. She continues to utilize her SCS and a regimen of  MS Contin 60 mg BID and Methocarbamol PRN for adequate pain control. She reports over 50% relief and a substantial increase in functionality when her medications are consumed. Of note, she is recovering from an aggressive UTI that had her hospitalized for a few days. She says she is improving although she did experience some acute pain as a result of the infection. \par \par \par UDS 3/16/23- consistent \par SOAPP, 1/12/23 - LOW RISK.

## 2023-04-06 NOTE — PHYSICAL EXAM
[] : diminished ROM in all planes [Flexion] : flexion [Extension] : extension [de-identified] :  Neurologic: Her speech is clear and fluent. She answers questions appropriately.   Back, including spine: Palpation of the thoracic/lumbar spine is as follows: midline lumbar tenderness. Range of motion of the thoracic and lumbar spine is as follows: Diminished range of motion in all planes. Gait and function is as follows: patient ambulates without assistive device and mildly antalgic gait.

## 2023-04-06 NOTE — ASSESSMENT
[FreeTextEntry1] : Ms. Tenorio is a 73 year old female who suffers with chronic back pain. She is recovering from an aggressive UTI and is on antibiotics for treatment.  She will continue with the above regimen as it has kept her pain stable. She will follow up in 4 weeks for reevaluation.\par \par I have consulted the [facility] registry for the purpose of reviewing the patient's controlled substance.\par \par Overall there is at least a 30-50% reduction in pain with the prescribed analgesics. The patient denies any adverse side effects due to the medication (sleeping disturbance, constipation, sleepiness, hallucinations and/or urination problems). \par \par I personally reviewed with the PA, this patient's history and physical exam findings, as documented above. I have discussed the relevant areas of concern, having direct implications to the presenting problems and illnesses, and I have personally examined all pertinent and positive and negative findings, which impact on the prior pain management treatment.\par \par Fareed Cuba PA-C\par Dulce Rojas MD\par

## 2023-04-28 ENCOUNTER — EMERGENCY (EMERGENCY)
Facility: HOSPITAL | Age: 73
LOS: 0 days | Discharge: HOME | End: 2023-04-28
Attending: EMERGENCY MEDICINE
Payer: MEDICARE

## 2023-04-28 VITALS
RESPIRATION RATE: 18 BRPM | OXYGEN SATURATION: 96 % | SYSTOLIC BLOOD PRESSURE: 147 MMHG | WEIGHT: 175.05 LBS | DIASTOLIC BLOOD PRESSURE: 63 MMHG | HEIGHT: 66 IN | TEMPERATURE: 97 F | HEART RATE: 92 BPM

## 2023-04-28 DIAGNOSIS — I10 ESSENTIAL (PRIMARY) HYPERTENSION: ICD-10-CM

## 2023-04-28 DIAGNOSIS — Z90.710 ACQUIRED ABSENCE OF BOTH CERVIX AND UTERUS: Chronic | ICD-10-CM

## 2023-04-28 DIAGNOSIS — E78.5 HYPERLIPIDEMIA, UNSPECIFIED: ICD-10-CM

## 2023-04-28 DIAGNOSIS — Z98.1 ARTHRODESIS STATUS: Chronic | ICD-10-CM

## 2023-04-28 DIAGNOSIS — B02.9 ZOSTER WITHOUT COMPLICATIONS: ICD-10-CM

## 2023-04-28 DIAGNOSIS — E11.9 TYPE 2 DIABETES MELLITUS WITHOUT COMPLICATIONS: ICD-10-CM

## 2023-04-28 DIAGNOSIS — Z96.653 PRESENCE OF ARTIFICIAL KNEE JOINT, BILATERAL: Chronic | ICD-10-CM

## 2023-04-28 DIAGNOSIS — M54.50 LOW BACK PAIN, UNSPECIFIED: ICD-10-CM

## 2023-04-28 DIAGNOSIS — Z88.1 ALLERGY STATUS TO OTHER ANTIBIOTIC AGENTS STATUS: ICD-10-CM

## 2023-04-28 DIAGNOSIS — Z90.49 ACQUIRED ABSENCE OF OTHER SPECIFIED PARTS OF DIGESTIVE TRACT: Chronic | ICD-10-CM

## 2023-04-28 PROCEDURE — 99283 EMERGENCY DEPT VISIT LOW MDM: CPT

## 2023-04-28 PROCEDURE — 99284 EMERGENCY DEPT VISIT MOD MDM: CPT

## 2023-04-28 RX ORDER — TRIAMTERENE/HYDROCHLOROTHIAZID 75 MG-50MG
1 TABLET ORAL
Qty: 0 | Refills: 0 | DISCHARGE

## 2023-04-28 RX ORDER — MORPHINE SULFATE 50 MG/1
0 CAPSULE, EXTENDED RELEASE ORAL
Qty: 0 | Refills: 0 | DISCHARGE

## 2023-04-28 RX ORDER — VALACYCLOVIR 500 MG/1
1 TABLET, FILM COATED ORAL
Qty: 21 | Refills: 0
Start: 2023-04-28 | End: 2023-05-04

## 2023-04-28 RX ORDER — GABAPENTIN 400 MG/1
0 CAPSULE ORAL
Qty: 0 | Refills: 0 | DISCHARGE

## 2023-04-28 RX ORDER — METOPROLOL TARTRATE 50 MG
0 TABLET ORAL
Qty: 0 | Refills: 0 | DISCHARGE

## 2023-04-28 NOTE — ED PROVIDER NOTE - NSFOLLOWUPINSTRUCTIONS_ED_ALL_ED_FT
**follow up with your primary care doctor within 1-3 days    Shingles    Shingles, which is also known as herpes zoster, is an infection that causes a painful skin rash and fluid-filled blisters. Shingles is not related to genital herpes, which is a sexually transmitted infection.     Shingles only develops in people who:    Have had chickenpox.  Have received the chickenpox vaccine. (This is rare.)    CAUSES  Shingles is caused by varicella-zoster virus (VZV). This is the same virus that causes chickenpox. After exposure to VZV, the virus stays in the body in an inactive (dormant) state. Shingles develops if the virus reactivates. This can happen many years after the initial exposure to VZV. It is not known what causes this virus to reactivate.    RISK FACTORS  People who have had chickenpox or received the chickenpox vaccine are at risk for shingles. Infection is more common in people who:    Are older than age 50.  Have a weakened defense (immune) system, such as those with HIV, AIDS, or cancer.  Are taking medicines that weaken the immune system, such as transplant medicines.  Are under great stress.    SYMPTOMS  Early symptoms of this condition include itching, tingling, and pain in an area on your skin. Pain may be described as burning, stabbing, or throbbing.    A few days or weeks after symptoms start, a painful red rash appears, usually on one side of the body in a bandlike or beltlike pattern. The rash eventually turns into fluid-filled blisters that break open, scab over, and dry up in about 2–3 weeks.    At any time during the infection, you may also develop:    A fever.  Chills.  A headache.  An upset stomach.    DIAGNOSIS  This condition is diagnosed with a skin exam. Sometimes, skin or fluid samples are taken from the blisters before a diagnosis is made. These samples are examined under a microscope or sent to a lab for testing.    TREATMENT  There is no specific cure for this condition. Your health care provider will probably prescribe medicines to help you manage pain, recover more quickly, and avoid long-term problems. Medicines may include:    Antiviral drugs.  Anti-inflammatory drugs.  Pain medicines.    If the area involved is on your face, you may be referred to a specialist, such as an eye doctor (ophthalmologist) or an ear, nose, and throat (ENT) doctor to help you avoid eye problems, chronic pain, or disability.    HOME CARE INSTRUCTIONS  Medicines    Take medicines only as directed by your health care provider.  Apply an anti-itch or numbing cream to the affected area as directed by your health care provider.    Blister and Rash Care    Take a cool bath or apply cool compresses to the area of the rash or blisters as directed by your health care provider. This may help with pain and itching.  Keep your rash covered with a loose bandage (dressing). Wear loose-fitting clothing to help ease the pain of material rubbing against the rash.  Keep your rash and blisters clean with mild soap and cool water or as directed by your health care provider.  Check your rash every day for signs of infection. These include redness, swelling, and pain that lasts or increases.  Do not pick your blisters.  Do not scratch your rash.    General Instructions    Rest as directed by your health care provider.  Keep all follow-up visits as directed by your health care provider. This is important.  Until your blisters scab over, your infection can cause chickenpox in people who have never had it or been vaccinated against it. To prevent this from happening, avoid contact with other people, especially:  Babies.  Pregnant women.  Children who have eczema.  Elderly people who have transplants.  People who have chronic illnesses, such as leukemia or AIDS.    SEEK MEDICAL CARE IF:  Your pain is not relieved with prescribed medicines.  Your pain does not get better after the rash heals.  Your rash looks infected. Signs of infection include redness, swelling, and pain that lasts or increases.    SEEK IMMEDIATE MEDICAL CARE IF:  The rash is on your face or nose.  You have facial pain, pain around your eye area, or loss of feeling on one side of your face.  You have ear pain or you have ringing in your ear.  You have loss of taste.  Your condition gets worse.    ADDITIONAL NOTES AND INSTRUCTIONS    Please follow up with your Primary MD in 24-48 hr.  Seek immediate medical care for any new/worsening signs or symptoms.

## 2023-04-28 NOTE — ED PROVIDER NOTE - NS ED MD DISPO DISCHARGE
----- Message from Shelley Morales sent at 10/9/2017 10:18 AM CDT -----   is calling to say that patient is in need of home health care. He states that she needs more help than he can give her. Please call to advise on how to start this at [phone].   Home

## 2023-04-28 NOTE — ED PROVIDER NOTE - PHYSICAL EXAMINATION
GENERAL: Well-nourished, Well-developed. NAD.  HEAD: No visible or palpable bumps or hematomas. No ecchymosis behind ears B/L.  Eyes: PERRLA, EOMI.   ENMT: MMM.  Neck: Supple. FROM  CVS: Normal S1,S2. No murmurs appreciated on auscultation   RESP: Lungs clear to auscultation B/L. No wheezing, rales, or rhonchi auscultated.  GI: Normal auscultation of bowel sounds in all 4 quadrants. Soft, Nontender, Nondistended. No guarding or rebound tenderness. No CVAT B/L.  MSK: No midline spinal TTP. FROM of back with flexion and extension.  Skin: (+)R lower back erythematous vesicular rash in dermatomal distribution consistent with shingles rash  EXT: Radial and pedal pulses present B/L. No calf tenderness or swelling B/L. No palpable cords. No pedal edema B/L.  Neuro: AA&O x 3. Sensation grossly intact. Strength 5/5 B/L. Gait within normal limits.

## 2023-04-28 NOTE — ED PROVIDER NOTE - CLINICAL SUMMARY MEDICAL DECISION MAKING FREE TEXT BOX
Patient with shingles, will discharge on antivirals with outpatient follow-up.  Patient counseled regarding conditions which should prompt return.

## 2023-04-28 NOTE — ED PROVIDER NOTE - PATIENT PORTAL LINK FT
You can access the FollowMyHealth Patient Portal offered by [facility] by registering at the following website: [email or web address]. By joining [employer]’s FollowMyHealth portal, you will also be able to view your health information using other applications (apps) compatible with our system.

## 2023-04-28 NOTE — ED PROVIDER NOTE - OBJECTIVE STATEMENT
74 yo F pmhx htn, hld, dm, presenting to the ED for evaluation of R lower back pain x 5 days. pt reports she noticed rash to R lower back a few days ago. denies any trauma or inciting events. denies fever, chills, numbness/tingling, weakness, urinary/bowel incontinence.

## 2023-05-09 ENCOUNTER — APPOINTMENT (OUTPATIENT)
Dept: UROLOGY | Facility: CLINIC | Age: 73
End: 2023-05-09

## 2023-05-11 ENCOUNTER — APPOINTMENT (OUTPATIENT)
Dept: PAIN MANAGEMENT | Facility: CLINIC | Age: 73
End: 2023-05-11
Payer: MEDICARE

## 2023-05-11 VITALS — WEIGHT: 180 LBS | HEIGHT: 66 IN | BODY MASS INDEX: 28.93 KG/M2

## 2023-05-11 VITALS — BODY MASS INDEX: 28.93 KG/M2 | HEIGHT: 66 IN | WEIGHT: 180 LBS

## 2023-05-11 PROCEDURE — 99213 OFFICE O/P EST LOW 20 MIN: CPT

## 2023-05-11 NOTE — HISTORY OF PRESENT ILLNESS
[FreeTextEntry1] : A continuing active encounter took place, previous history and exam reviewed.\par \par ORIGINAL PRESENTATION: Ms. Tenorio is a 73 year old woman who we have been following since 2011 for chronic low back pain associated with radiculopathy symptoms. On May 4th 2012, the patient underwent an L2-L3 and L3-L4 direct lateral interbody fusion along with an L4-L5 and L5-S1 transforaminal lumbar interbody fusion with posterior screw fixation. On Oct 30th, 2013, she had a fluoroscopically guided bilateral L5-S1 hardware block and reports about 70% relief of her pain symptoms. She underwent a hardware removal procedure in April 2014.\par \par TODAY: Last seen on 03/16/2023. The reason for the visit is for a continuing active encounter. She is under our care for chronic lumbar pain. She states her back pain today is exacerbated by shingles as she is experiencing numbness and tingling over the area of the lesions. She was treated in the ER with a ten day course of a medication which she does not recall. It was most likely an anti-viral agent. She continues to utilize her SCS and a regimen of MS Contin 60 mg BID and Methocarbamol PRN for adequate pain control. She states that with the prescribed medication she is able to move around the house with minimal pain. She is able to sleep more adequately. She wishes to continue as is without change. \par \par \par UDS 3/16/23- consistent \par SOAPP, 1/12/23 - LOW RISK.

## 2023-05-11 NOTE — ASSESSMENT
[FreeTextEntry1] : Ms. Tenorio is a 73 year old female who suffers with chronic back pain. She currently has back pain secondary to an active shingles activation. She states that someone advised her to come in and be seen today. I advised her to follow up with her PCP and continue to utilize the medication that was prescribed by the emergency room provider. I also advised her to keep the affected area dry and covered. She will continue with the above regimen as it has kept her pain stable. She will follow up in 4 weeks for reevaluation.\par \par I have consulted the  registry for the purpose of reviewing the patient's controlled substance.\par \par Overall there is at least a 30-50% reduction in pain with the prescribed analgesics. The patient denies any adverse side effects due to the medication (sleeping disturbance, constipation, sleepiness, hallucinations and/or urination problems). \par \par Fareed Cuba PA-C\par Dulce Rojas MD\par

## 2023-05-11 NOTE — PHYSICAL EXAM
[] : diminished ROM in all planes [Flexion] : flexion [Extension] : extension [de-identified] :  Neurologic: Her speech is clear and fluent. She answers questions appropriately.   Back, including spine: Palpation of the thoracic/lumbar spine is as follows: midline lumbar tenderness. Range of motion of the thoracic and lumbar spine is as follows: Diminished range of motion in all planes. Gait and function is as follows: patient ambulates without assistive device and mildly antalgic gait.

## 2023-06-15 ENCOUNTER — APPOINTMENT (OUTPATIENT)
Dept: PAIN MANAGEMENT | Facility: CLINIC | Age: 73
End: 2023-06-15
Payer: MEDICARE

## 2023-06-15 VITALS — WEIGHT: 180 LBS | BODY MASS INDEX: 28.93 KG/M2 | HEIGHT: 66 IN

## 2023-06-15 PROCEDURE — 99213 OFFICE O/P EST LOW 20 MIN: CPT

## 2023-06-15 NOTE — HISTORY OF PRESENT ILLNESS
[FreeTextEntry1] : A continuing active encounter took place, previous history and exam reviewed.\par \par ORIGINAL PRESENTATION: Ms. Tenorio is a 73 year old woman who we have been following since 2011 for chronic low back pain associated with radiculopathy symptoms. On May 4th 2012, the patient underwent an L2-L3 and L3-L4 direct lateral interbody fusion along with an L4-L5 and L5-S1 transforaminal lumbar interbody fusion with posterior screw fixation. On Oct 30th, 2013, she had a fluoroscopically guided bilateral L5-S1 hardware block and reports about 70% relief of her pain symptoms. She underwent a hardware removal procedure in April 2014.\par \par TODAY: Last seen on 05/11/2023. The reason for the visit is for a continuing active encounter. She is under our care for chronic lumbar pain. Patient recently had a re-activation of shingles which has left her with severe localized pain in the area of the lesion. She states that the lesion has healed, however, she is experiencing severe burning, numbness, and tingling sensations in that area. She has trialed gabapentin which made her uncomfortable as she stated she had been hallucinating when consumed. We discussed trialing lidocaine patches for relief of this pain and she is agreeable. Unfortunately, the area that was affected is also the area of her SCS. She continues to utilize her SCS and a regimen of MS Contin 60 mg BID and Methocarbamol PRN for adequate pain control. She is able to appreciate over 50% relief with the prescribed medications. She wishes to continue as is without change. \par \par \par UDS 3/16/23- consistent \par SOAPP, 1/12/23 - LOW RISK.

## 2023-06-15 NOTE — ASSESSMENT
[FreeTextEntry1] : Ms. Tenorio is a 73 year old female who suffers with chronic back pain. Patient is recovering from a recent shingles re-activation. She presents with symptoms of postherpetic neuralgia. We will prescribe Lidocaine patches to help alleviate her symptoms. She will continue with morphine sulfate and methocarbamol unchanged. She will follow up in 4 weeks for reevaluation.\par \par I have consulted the  registry for the purpose of reviewing the patient's controlled substance.\par \par Overall there is at least a 30-50% reduction in pain with the prescribed analgesics. The patient denies any adverse side effects due to the medication (sleeping disturbance, constipation, sleepiness, hallucinations and/or urination problems). \par \par Fareed Cuba PA-C\par Dulce Rjoas MD\par

## 2023-06-15 NOTE — PHYSICAL EXAM
[] : diminished ROM in all planes [Flexion] : flexion [Extension] : extension [de-identified] :  Neurologic: Her speech is clear and fluent. She answers questions appropriately.   Back, including spine: Palpation of the thoracic/lumbar spine is as follows: midline lumbar tenderness. Range of motion of the thoracic and lumbar spine is as follows: Diminished range of motion in all planes. Gait and function is as follows: patient ambulates without assistive device and mildly antalgic gait.

## 2023-07-08 NOTE — ED PROVIDER NOTE - NS ED ATTENDING STATEMENT MOD
· Extensive review of med rec, personally entered every medication with records from group home  · Pt maintained on IM Abilify Maintena ER every month, citalopram daily, clozapine hs, fluvoxaminehs, doxepin hs, lorazepam hs, lithium hs, olanzapine BID, Cogentin daily   · Also on Cogentin prn EPS  · Appreciate psych consult, likely overmedication and polypharmacy contributing to presentation  · Discussed with pharmacy and psych, will discontinue clozapine and fluvoxamine (fluvoxamine today increase clozapine levels by 50%). Decreased doxepin and Cogentin to reduce anticholinergic effects.    · Repeat lithium levels WNL  · Check clozapine level (send out test, expect results in 3-5 days) This was a shared visit with the RAYMUNDO. I reviewed and verified the documentation and independently performed the documented:

## 2023-07-26 ENCOUNTER — APPOINTMENT (OUTPATIENT)
Dept: PAIN MANAGEMENT | Facility: CLINIC | Age: 73
End: 2023-07-26
Payer: MEDICARE

## 2023-07-26 VITALS — BODY MASS INDEX: 28.93 KG/M2 | WEIGHT: 180 LBS | HEIGHT: 66 IN

## 2023-07-26 PROCEDURE — 99213 OFFICE O/P EST LOW 20 MIN: CPT

## 2023-07-26 RX ORDER — LIDOCAINE 5% 700 MG/1
5 PATCH TOPICAL
Qty: 1 | Refills: 0 | Status: ACTIVE | COMMUNITY
Start: 2023-06-15 | End: 1900-01-01

## 2023-07-26 RX ORDER — METHOCARBAMOL 750 MG/1
750 TABLET, FILM COATED ORAL
Qty: 90 | Refills: 1 | Status: DISCONTINUED | COMMUNITY
Start: 2023-01-12 | End: 2023-07-26

## 2023-07-26 NOTE — HISTORY OF PRESENT ILLNESS
[FreeTextEntry1] : A continuing active encounter took place, previous history and exam reviewed.\par \par ORIGINAL PRESENTATION: Ms. Tenorio is a 73 year old woman who we have been following since 2011 for chronic low back pain associated with radiculopathy symptoms. On May 4th 2012, the patient underwent an L2-L3 and L3-L4 direct lateral interbody fusion along with an L4-L5 and L5-S1 transforaminal lumbar interbody fusion with posterior screw fixation. On Oct 30th, 2013, she had a fluoroscopically guided bilateral L5-S1 hardware block and reports about 70% relief of her pain symptoms. She underwent a hardware removal procedure in April 2014.\par \par TODAY: Last seen on 06/15/2023. The reason for the visit is for a continuing active encounter. She is under our care for chronic lumbar pain. Patient states she is starting to feel better s/p re-activation of shingles. She notes occasional severe burning, numbness, and tingling sensations in the area of the lesions. Lidocaine, patches and gabapentin did not provide relief. She continues to utilize her SCS and a regimen of MS Contin 60 mg BID and Methocarbamol PRN for adequate pain control. She is able to appreciate over 50% relief with the prescribed medications. She wishes to continue as is without change. \par \par \par UDS 3/16/23- consistent \par SOAPP, 1/12/23 - LOW RISK.

## 2023-07-26 NOTE — ASSESSMENT
[FreeTextEntry1] : Ms. Tenorio is a 73 year old female who suffers with chronic back pain. Patient is recovering from a recent shingles re-activation. Overall, she states that she has improved. We will continue to prescribe morphine sulfate and methocarbamol unchanged. The patient is stable on current pain medication with analgesia and without notable side effects or any obvious aberrant behaviors exhibited. Will renew medication today. She will follow up in 4 weeks for reevaluation.\par \par I have consulted the  registry for the purpose of reviewing the patient's controlled substance.\par \par Overall there is at least a 30-50% reduction in pain with the prescribed analgesics. The patient denies any adverse side effects due to the medication (sleeping disturbance, constipation, sleepiness, hallucinations and/or urination problems). \par \par Fareed Cuba PA-C\par Dulce Rojas MD\par

## 2023-07-26 NOTE — PHYSICAL EXAM
[] : diminished ROM in all planes [Flexion] : flexion [Extension] : extension [de-identified] :  Neurologic: Her speech is clear and fluent. She answers questions appropriately.   Back, including spine: Palpation of the thoracic/lumbar spine is as follows: midline lumbar tenderness. Range of motion of the thoracic and lumbar spine is as follows: Diminished range of motion in all planes. Gait and function is as follows: patient ambulates without assistive device and mildly antalgic gait.

## 2023-08-24 ENCOUNTER — APPOINTMENT (OUTPATIENT)
Dept: PAIN MANAGEMENT | Facility: CLINIC | Age: 73
End: 2023-08-24
Payer: MEDICARE

## 2023-08-24 VITALS — HEIGHT: 66 IN | BODY MASS INDEX: 28.93 KG/M2 | WEIGHT: 180 LBS

## 2023-08-24 PROCEDURE — 99213 OFFICE O/P EST LOW 20 MIN: CPT

## 2023-08-24 NOTE — PHYSICAL EXAM
[] : diminished ROM in all planes [Flexion] : flexion [Extension] : extension [de-identified] :  Neurologic: Her speech is clear and fluent. She answers questions appropriately.   Back, including spine: Palpation of the thoracic/lumbar spine is as follows: midline lumbar tenderness. Range of motion of the thoracic and lumbar spine is as follows: Diminished range of motion in all planes. Gait and function is as follows: patient ambulates without assistive device and mildly antalgic gait.

## 2023-08-24 NOTE — HISTORY OF PRESENT ILLNESS
[FreeTextEntry1] : A continuing active encounter took place, previous history and exam reviewed.  ORIGINAL PRESENTATION: Ms. Tenorio is a 73 year old woman who we have been following since 2011 for chronic low back pain associated with radiculopathy symptoms. On May 4th 2012, the patient underwent an L2-L3 and L3-L4 direct lateral interbody fusion along with an L4-L5 and L5-S1 transforaminal lumbar interbody fusion with posterior screw fixation. On Oct 30th, 2013, she had a fluoroscopically guided bilateral L5-S1 hardware block and reports about 70% relief of her pain symptoms. She underwent a hardware removal procedure in April 2014.  TODAY: Last seen on 07/26/2023. The reason for the visit is for a continuing active encounter. She is under our care for chronic lumbar pain and post-herpetic neuralgia. She states her pain has been moderate to severe. She notes widespread and severe burning, numbness, and tingling sensations into her upper and lower extremities bilaterally. She states her pain was not severe until her shingles re-activation. Lidocaine, patches and gabapentin did not provide relief. She continues to utilize her SCS and a regimen of MS Contin 60 mg BID and Methocarbamol PRN for adequate pain control. We discussed trialing Duloxetine 60 mg ER BID and she is agreeable.    UDS 3/16/23- consistent  SOAPP, 1/12/23 - LOW RISK.

## 2023-08-24 NOTE — ASSESSMENT
[FreeTextEntry1] : Ms. Tenorio is a 73 year old female who suffers with chronic back pain. Patient is recovering from a recent shingles re-activation. She presents with a severe exacerbation of neuropathic pain. We will prescribe Duloxetine 60 mg ER BID. We will continue to prescribe morphine sulfate and methocarbamol unchanged. Will renew medication today. She will follow up in 4 weeks for reevaluation.  I have consulted the  registry for the purpose of reviewing the patient's controlled substance.  Overall there is at least a 30-50% reduction in pain with the prescribed analgesics. The patient denies any adverse side effects due to the medication (sleeping disturbance, constipation, sleepiness, hallucinations and/or urination problems).   DANO Campos MD

## 2023-09-20 ENCOUNTER — RX RENEWAL (OUTPATIENT)
Age: 73
End: 2023-09-20

## 2023-09-28 ENCOUNTER — LABORATORY RESULT (OUTPATIENT)
Age: 73
End: 2023-09-28

## 2023-09-28 ENCOUNTER — APPOINTMENT (OUTPATIENT)
Dept: PAIN MANAGEMENT | Facility: CLINIC | Age: 73
End: 2023-09-28
Payer: MEDICARE

## 2023-09-28 VITALS — WEIGHT: 180 LBS | HEIGHT: 66 IN | BODY MASS INDEX: 28.93 KG/M2

## 2023-09-28 LAB — MOP / MORPHINE: POSITIVE

## 2023-09-28 PROCEDURE — 99214 OFFICE O/P EST MOD 30 MIN: CPT

## 2023-09-28 PROCEDURE — 80305 DRUG TEST PRSMV DIR OPT OBS: CPT | Mod: QW

## 2023-10-05 LAB
PM 6 MAM: NEGATIVE NG/ML
PM 7-AMINO-CLONAZ: NEGATIVE NG/ML
PM ALPHA-HYDROXY-ALPRAZOLAM: NEGATIVE NG/ML
PM ALPHA-HYDROXY-MIDAZOLAM: NEGATIVE NG/ML
PM ALPRAZOLAM: NEGATIVE NG/ML
PM AMOBARBITAL: NEGATIVE NG/ML
PM AMPHETAMINE INTERP: NEGATIVE
PM AMPHETAMINE: NEGATIVE NG/ML
PM BARBURATES INTERP: NEGATIVE
PM BEG: NEGATIVE NG/ML
PM BENZODIAZEPINES INTERP: NEGATIVE
PM BUPRENORPHINE INTERP: NEGATIVE
PM BUPRENORPHINE: NEGATIVE NG/ML
PM BUTALBITAL: NEGATIVE NG/ML
PM CLONAZEPAM: NEGATIVE NG/ML
PM COCAINE INTERP: NEGATIVE
PM COCAINE: NEGATIVE NG/ML
PM CODIENE: NEGATIVE NG/ML
PM COTININE: NEGATIVE NG/ML
PM DIAZEPAM: NEGATIVE NG/ML
PM DIHYROCODEINE: NEGATIVE NG/ML
PM EDDP: NEGATIVE NG/ML
PM FENTANYL INTERP: NEGATIVE
PM FENTANYL: NEGATIVE NG/ML
PM FLUNITRAZEPAM: NEGATIVE NG/ML
PM FLURAZEPAM: NEGATIVE NG/ML
PM HYDROCODONE: NEGATIVE NG/ML
PM HYDROMORPHONE: 63 NG/ML
PM LORAZEPAM: NEGATIVE NG/ML
PM MARIJUANA (DELTA-9-THC): NEGATIVE NG/ML
PM MARIJUANA INTERP: NEGATIVE
PM MDA: NEGATIVE NG/ML
PM MDEA: NEGATIVE NG/ML
PM MDMA: NEGATIVE NG/ML
PM MEPERIDINE: NEGATIVE NG/ML
PM METHADONE INTERP: NEGATIVE
PM METHADONE: NEGATIVE NG/ML
PM METHAMPHETAMINE: NEGATIVE NG/ML
PM MIDAZOLAM: NEGATIVE NG/ML
PM MORPHINE: >1000 NG/ML
PM NALOXONE: NEGATIVE NG/ML
PM NALTREXONE: NEGATIVE NG/ML
PM NICOTINE INTERP: NEGATIVE
PM NORBUPRENORPHINE: NEGATIVE NG/ML
PM NORDIAZEPAM: NEGATIVE NG/ML
PM NORMEPERIDINE: NEGATIVE NG/ML
PM NOROXYCODONE: NEGATIVE NG/ML
PM OPIOID INTERP: POSITIVE
PM OXAZEPAM: NEGATIVE NG/ML
PM OXXYCODONE INTERP: NEGATIVE
PM OXYCODONE: NEGATIVE NG/ML
PM OXYMORPHONE: NEGATIVE NG/ML
PM PCP: NEGATIVE NG/ML
PM PHENCYCLIDINE INTERP: NEGATIVE
PM PHENOBARBITAL: NEGATIVE NG/ML
PM PPX: NEGATIVE NG/ML
PM PROPOXYPHENE INTERP: NEGATIVE
PM SECOBARBITAL: NEGATIVE NG/ML
PM SUFENTANIL: NEGATIVE NG/ML
PM TAPENTADOL: NEGATIVE NG/ML
PM TEMAZEPAM: NEGATIVE NG/ML
PM TRAMADOL INTERP: NEGATIVE
PM TRAMADOL: NEGATIVE NG/ML

## 2023-10-20 ENCOUNTER — RX RENEWAL (OUTPATIENT)
Age: 73
End: 2023-10-20

## 2023-10-31 ENCOUNTER — APPOINTMENT (OUTPATIENT)
Dept: PAIN MANAGEMENT | Facility: CLINIC | Age: 73
End: 2023-10-31

## 2023-11-01 ENCOUNTER — APPOINTMENT (OUTPATIENT)
Dept: PAIN MANAGEMENT | Facility: CLINIC | Age: 73
End: 2023-11-01
Payer: MEDICARE

## 2023-11-01 PROCEDURE — 99214 OFFICE O/P EST MOD 30 MIN: CPT

## 2023-11-29 ENCOUNTER — APPOINTMENT (OUTPATIENT)
Dept: PAIN MANAGEMENT | Facility: CLINIC | Age: 73
End: 2023-11-29
Payer: MEDICARE

## 2023-11-29 VITALS — WEIGHT: 180 LBS | BODY MASS INDEX: 28.93 KG/M2 | HEIGHT: 66 IN

## 2023-11-29 PROCEDURE — 99214 OFFICE O/P EST MOD 30 MIN: CPT

## 2024-01-03 ENCOUNTER — LABORATORY RESULT (OUTPATIENT)
Age: 74
End: 2024-01-03

## 2024-01-03 ENCOUNTER — APPOINTMENT (OUTPATIENT)
Dept: PAIN MANAGEMENT | Facility: CLINIC | Age: 74
End: 2024-01-03
Payer: MEDICARE

## 2024-01-03 LAB
AMP / AMPHETAMINE: NEGATIVE
BAR / SECOBARBITAL: NEGATIVE
BUP / BUPRENORPHINE: NEGATIVE
BZO / OXAZEPAM: NEGATIVE
COC / COCAINE: NEGATIVE
CREATININE: NORMAL MG/DL
MDMA / METHYLENEDIOXYMETHAMPHETAMINE: NEGATIVE
MET / METHAMPHETAMINES: NEGATIVE
MOP / MORPHINE: POSITIVE
MTD / METHADONE: NEGATIVE
OXY / OXYCODONE: NEGATIVE
PCP / PHENCYCLIDINE: NEGATIVE
PH: NORMAL
SPECIFIC GRAVITY: NORMAL
TEMPERATURE: 90 F
THC / MARIJUANA: NEGATIVE

## 2024-01-03 PROCEDURE — 80305 DRUG TEST PRSMV DIR OPT OBS: CPT | Mod: QW

## 2024-01-03 PROCEDURE — 99214 OFFICE O/P EST MOD 30 MIN: CPT

## 2024-01-03 NOTE — ASSESSMENT
[FreeTextEntry1] : Ms. Tenorio is a 73-year-old female who suffers with chronic back pain. She will continue to utilize Duloxetine 60 mg ER BID and methocarbamol unchanged. We have been weaning her off of MS Contin with the long-term goal of weaning her off of this regimen completely. Today we will continue to do so by titrating down to MS Contin 30 mg BID.  She will follow up in 4 weeks for reevaluation.  I have consulted the  registry for the purpose of reviewing the patient's-controlled substance.  Overall there is at least a 30-50% reduction in pain with the prescribed analgesics. The patient denies any adverse side effects due to the medication (sleeping disturbance, constipation, sleepiness, hallucinations and/or urination problems).  DANO Campos MD.

## 2024-01-03 NOTE — HISTORY OF PRESENT ILLNESS
[FreeTextEntry1] : A continuing active encounter took place, previous history and exam reviewed.  ORIGINAL PRESENTATION: Ms. Tenorio is a 73 year old woman who we have been following since 2011 for chronic low back pain associated with radiculopathy symptoms. On May 4th 2012, the patient underwent an L2-L3 and L3-L4 direct lateral interbody fusion along with an L4-L5 and L5-S1 transforaminal lumbar interbody fusion with posterior screw fixation. On Oct 30th, 2013, she had a fluoroscopically guided bilateral L5-S1 hardware block and reports about 70% relief of her pain symptoms. She underwent a hardware removal procedure in April 2014.  TODAY: The reason for the visit is for a continuing active encounter. She is under our care for chronic lumbar pain and post-herpetic neuralgia. The numbness, and tingling sensations in her upper and lower extremities have improved with the use of duloxetine. She has trialed numerous nerve medications which did not alleviate her pain. She continues to utilize her SCS and a regimen of MS Contin 60 mg BID. We have been weaning her off of MS Contin with the long-term goal of weaning her off of this regimen completely. Today we will continue to do so by titrating down to MS Contin 30 mg BID. She is understanding.   UDS repeated today  SOAPP, 1/12/23 - LOW RISK.

## 2024-01-03 NOTE — PHYSICAL EXAM
[de-identified] :  Neurologic: Her speech is clear and fluent. She answers questions appropriately.   Back, including spine: Palpation of the thoracic/lumbar spine is as follows: midline lumbar tenderness. Range of motion of the thoracic and lumbar spine is as follows: Diminished range of motion in all planes. Gait and function is as follows: patient ambulates without assistive device and mildly antalgic gait.  [] : diminished ROM in all planes [Flexion] : flexion [Extension] : extension

## 2024-01-30 ENCOUNTER — APPOINTMENT (OUTPATIENT)
Dept: PAIN MANAGEMENT | Facility: CLINIC | Age: 74
End: 2024-01-30
Payer: MEDICARE

## 2024-01-30 PROCEDURE — 99214 OFFICE O/P EST MOD 30 MIN: CPT

## 2024-01-30 RX ORDER — DULOXETINE HYDROCHLORIDE 60 MG/1
60 CAPSULE, DELAYED RELEASE PELLETS ORAL
Qty: 90 | Refills: 1 | Status: ACTIVE | COMMUNITY
Start: 2023-08-24 | End: 1900-01-01

## 2024-01-30 NOTE — DISCUSSION/SUMMARY
[Medication Risks Reviewed] : Medication risks reviewed [de-identified] : Ms. Tenorio is a 74-year-old female who suffers with chronic back pain. She will continue to utilize Duloxetine 60 mg  BID which we will increase to TID. We have been weaning her off of MS Contin with the long-term goal of weaning her off of this regimen completely. Today we will continue to do so by titrating down to MS Contin 15 mg BID.  She will follow up in 4 weeks for reevaluation.  I have consulted the  registry for the purpose of reviewing the patient's-controlled substance. UDS will repeat on next visit. Overall there is at least a 30-50% reduction in pain with the prescribed analgesics. The patient denies any adverse side effects due to the medication (sleeping disturbance, constipation, sleepiness, hallucinations and/or urination problems).  Total clinician time spent today on the patient is 30 minutes including preparing to see the patient, obtaining and/or reviewing and confirming history, performing medically necessary and appropriate examination, counseling and educating the patient and/or family, documenting clinical information in the EHR and communicating and/or referring to other healthcare professionals.  DANO Garcia MD.

## 2024-01-30 NOTE — PHYSICAL EXAM
[de-identified] :  Neurologic: Her speech is clear and fluent. She answers questions appropriately.   Back, including spine: Palpation of the thoracic/lumbar spine is as follows: midline lumbar tenderness. Range of motion of the thoracic and lumbar spine is as follows: Diminished range of motion in all planes. Gait and function is as follows: patient ambulates without assistive device and mildly antalgic gait.  [] : diminished ROM in all planes [Flexion] : flexion [Extension] : extension

## 2024-01-30 NOTE — HISTORY OF PRESENT ILLNESS
[FreeTextEntry1] : A continuing active encounter took place, previous history and exam reviewed.  ORIGINAL PRESENTATION: Ms. Tenorio is a 74 year old woman who we have been following since 2011 for chronic low back pain associated with radiculopathy symptoms. On May 4th 2012, the patient underwent an L2-L3 and L3-L4 direct lateral interbody fusion along with an L4-L5 and L5-S1 transforaminal lumbar interbody fusion with posterior screw fixation. On Oct 30th, 2013, she had a fluoroscopically guided bilateral L5-S1 hardware block and reports about 70% relief of her pain symptoms. She underwent a hardware removal procedure in April 2014.  TODAY: Last seen on 01/03/2024 and since then there has been no new complaints or acute changes.  She is under our care for chronic lumbar pain and post-herpetic neuralgia. The numbness, and tingling sensations in her upper and lower extremities have improved with the use of duloxetine. She has trialed numerous nerve medications which did not alleviate her pain. She continues to utilize her SCS and a regimen of MS Contin 30 mg BID. We have been weaning her off of MS Contin with the long-term goal of weaning her off of this regimen completely. Today we will continue to do so by titrating down to MS Contin 15 mg BID. She is understanding. We will increase her Duloxetine 60mg to TID.  UDS will repeat on next visit.

## 2024-02-27 ENCOUNTER — APPOINTMENT (OUTPATIENT)
Dept: PAIN MANAGEMENT | Facility: CLINIC | Age: 74
End: 2024-02-27
Payer: MEDICARE

## 2024-02-27 PROCEDURE — 99214 OFFICE O/P EST MOD 30 MIN: CPT

## 2024-02-27 RX ORDER — MORPHINE SULFATE 15 MG/1
15 TABLET, FILM COATED, EXTENDED RELEASE ORAL DAILY
Qty: 30 | Refills: 0 | Status: ACTIVE | COMMUNITY
Start: 2022-08-11 | End: 1900-01-01

## 2024-02-27 NOTE — DISCUSSION/SUMMARY
[de-identified] : Pt is a 74-year-old female who is here today for a revisit. She sees us for ongoing chronic back pain, associated with numbness, and tingling sensations in her lower extremities as well as post-herpetic neuralgia. She is medically managed with Duloxetine 60 mg TID, as well as MS Contin. On her last visit, her MS Contin was decreased to 15 mg BID. Today we will decrease her to MS Contin 15 mg QD. We also discussed undergoing PT of the lumbar spine, which she agrees with. A PT script was given to her today.   Physical therapy of the lumbar spine 2-3 times a week for 4-8 weeks stressing a home exercise program of walking, shoulder girdle strengthening,  swimming, elliptical , recumbent bike, Lázaro chi and Yoga. Use things that heat like hot shower or icy heat before rehab, exercising and at the beginning of the day, and ice (ice in a bag never directly on the skin) after activity and at the end of the day.  I have consulted the  registry for the purpose of reviewing the patient's controlled substance.   I explained the risk of addiction, tolerance and withdrawals and  advised the patient they must keep their medication under a lock and key, or in a safe place away from children or other individuals. This medication given is solely for the patient and under no circumstances to be shared. Patient verbalized this and is in agreement with the aforementioned. I explained the risk of addiction, tolerance, and withdrawals. UDS will be done randomly and a drug agreement was signed.  The patient will return to the office in 4 weeks' time and is aware to contact me with any question or concerns in the interim.  DANO Hernandez MD

## 2024-02-27 NOTE — PHYSICAL EXAM
[] : diminished ROM in all planes [Flexion] : flexion [Extension] : extension [de-identified] :  Neurologic: Her speech is clear and fluent. She answers questions appropriately.   Back, including spine: Palpation of the thoracic/lumbar spine is as follows: midline lumbar tenderness. Range of motion of the thoracic and lumbar spine is as follows: Diminished range of motion in all planes. Gait and function is as follows: patient ambulates without assistive device and mildly antalgic gait.

## 2024-02-27 NOTE — HISTORY OF PRESENT ILLNESS
Refill requested from pharmacy. Per last chart note, patient to increase Hydrea to 1500 mg 6 days a week and 1000 mg 1 days a week . Will route to MD for cosignature.    Elder Holder, PharmD, BCOP     [FreeTextEntry1] : A continuing active encounter took place, previous history and exam reviewed.  ORIGINAL PRESENTATION: Ms. Tenorio is a 74 year old woman who we have been following since 2011 for chronic low back pain associated with radiculopathy symptoms. On May 4th 2012, the patient underwent an L2-L3 and L3-L4 direct lateral interbody fusion along with an L4-L5 and L5-S1 transforaminal lumbar interbody fusion with posterior screw fixation. On Oct 30th, 2013, she had a fluoroscopically guided bilateral L5-S1 hardware block and reports about 70% relief of her pain symptoms. She underwent a hardware removal procedure in April 2014.  TODAY:  Ms. Tenorio is a very pleasant 74-year-old female who is here today for a revisit, she was last seen on 1/30/24, there are no new complaints or acute changes.  She is under our care for ongoing chronic back pain, associated with numbness and tingling sensations in her lower extremities as well as post-herpetic neuralgia. She was previously managed with MS Contin 60 mg BID, for which we started the weaning off process. Over the past 8 weeks or so, she has been decreased to MS Contin 15 mg BID. Today she will be decreased to MS Contin 15 mg QD. The plan is to completely wean her off. Pt verbalized understanding and agrees with the plan.  She has been taking Duloxetine 60 mg TID since her last visit, which she states aides with her pain.  She also has a lumbar SCS which she continues to utilize, and states that it improves her pain.  We discussed PT and pt agrees, we will give her a script today.

## 2024-03-13 RX ORDER — LOFEXIDINE HYDROCHLORIDE 0.2 MG/1
0.18 TABLET, FILM COATED ORAL
Qty: 120 | Refills: 0 | Status: ACTIVE | COMMUNITY
Start: 2024-03-13 | End: 1900-01-01

## 2024-03-27 ENCOUNTER — APPOINTMENT (OUTPATIENT)
Dept: PAIN MANAGEMENT | Facility: CLINIC | Age: 74
End: 2024-03-27
Payer: MEDICARE

## 2024-03-27 DIAGNOSIS — Z79.891 LONG TERM (CURRENT) USE OF OPIATE ANALGESIC: ICD-10-CM

## 2024-03-27 DIAGNOSIS — M96.1 POSTLAMINECTOMY SYNDROME, NOT ELSEWHERE CLASSIFIED: ICD-10-CM

## 2024-03-27 DIAGNOSIS — Z79.899 OTHER LONG TERM (CURRENT) DRUG THERAPY: ICD-10-CM

## 2024-03-27 DIAGNOSIS — M47.817 SPONDYLOSIS W/OUT MYELOPATHY OR RADICULOPATHY, LUMBOSACRAL REGION: ICD-10-CM

## 2024-03-27 DIAGNOSIS — B02.29 OTHER POSTHERPETIC NERVOUS SYSTEM INVOLVEMENT: ICD-10-CM

## 2024-03-27 DIAGNOSIS — G89.4 CHRONIC PAIN SYNDROME: ICD-10-CM

## 2024-03-27 DIAGNOSIS — M54.17 RADICULOPATHY, LUMBOSACRAL REGION: ICD-10-CM

## 2024-03-27 DIAGNOSIS — M54.9 DORSALGIA, UNSPECIFIED: ICD-10-CM

## 2024-03-27 PROCEDURE — 99214 OFFICE O/P EST MOD 30 MIN: CPT

## 2024-03-27 RX ORDER — MORPHINE SULFATE 15 MG/1
15 TABLET ORAL
Qty: 14 | Refills: 0 | Status: ACTIVE | COMMUNITY
Start: 2024-03-27 | End: 1900-01-01

## 2024-03-27 NOTE — PHYSICAL EXAM
[] : diminished ROM in all planes [Flexion] : flexion [Extension] : extension [de-identified] :  Neurologic: Her speech is clear and fluent. She answers questions appropriately.   Back, including spine: Palpation of the thoracic/lumbar spine is as follows: midline lumbar tenderness. Range of motion of the thoracic and lumbar spine is as follows: Diminished range of motion in all planes. Gait and function is as follows: patient ambulates without assistive device and mildly antalgic gait.

## 2024-03-27 NOTE — HISTORY OF PRESENT ILLNESS
[FreeTextEntry1] : A continuing active encounter took place, previous history and exam reviewed.  ORIGINAL PRESENTATION: Ms. Tenorio is a 74 year old woman who we have been following since 2011 for chronic low back pain associated with radiculopathy symptoms. On May 4th 2012, the patient underwent an L2-L3 and L3-L4 direct lateral interbody fusion along with an L4-L5 and L5-S1 transforaminal lumbar interbody fusion with posterior screw fixation. On Oct 30th, 2013, she had a fluoroscopically guided bilateral L5-S1 hardware block and reports about 70% relief of her pain symptoms. She underwent a hardware removal procedure in April 2014.  TODAY:  Ms. Tenorio is a very pleasant 74-year-old female who is here today for a revisit. She is under our care for ongoing chronic back pain, associated with numbness and tingling sensations in her lower extremities as well as post-herpetic neuralgia. She has a SCS which she uses on a daily basis. She was referred for PT, which she is yet to start.  Patient has been medically managed at our office over the years. She was taking MS Contin 60 mg BID. We started weaning her off of the medication at the end of November. Over the past 4 months or so, she has been decreased to MS Contin 15 mg QD. She had called our office last week stating that she had doubled up on her medication because she was having withdrawals and did not want to feel sick. At that time, she told me she ran out of medication and therefore I called om Lucemyra to take for any persistent withdrawals she would have. When questioned about Lucemyra, she initially stated that the medication was not helping her. She states that she is still experiencing "withdrawal symptoms," which she states include: weakness in her upper and lower extremities, headaches that are intermittent and bilateral knee pain. She admits to falling due to feeling dizzy two times last week, and yesterday she was trying to hold a plate and it fell from her hands due to her hand weakness. She states she was seen by her PCP last week and was advised that her she was weaned off too quickly. As per the patient, her bloodwork was all normal. Patient states she needs an additional 2 weeks of weaning because she doesn't want to "spend Easter in bed." I tried to reassure the patient that her current symptoms may not be related to her weaning off of her medication and rather it may be due to some neurological process that coincidentally began while weaning her off of her narcotic. Never the less, I advised the patient that I will prescribed her Morphine IR 15 mg. She is to take half a tablet twice a day X 1 week and then half a tablet daily for another week. Pt was advised to take it as it was instructed. This will be her last script and she will no longer be prescribed narcotics from our office. Pt verbalized understanding and agreed.   She has been taking Duloxetine 60 mg BID instead of TID. I advised her that the Duloxetine could be causing her neurological symptoms; however, she does not believe it is and wishes to continue with the medication as is.

## 2024-03-27 NOTE — DISCUSSION/SUMMARY
[de-identified] : Pt is a 74-year-old female who is here today for a revisit. She sees us for ongoing chronic back pain, associated with numbness, and tingling sensations in her lower extremities as well as post-herpetic neuralgia. We have been weaning her off of MS Contin, which she states gave her significant withdrawal symptoms especially in the last month. I have sent a prescription for MS IR 15 mg and she will take it as instructed. This will be her last script and she will no longer be prescribed narcotics from our office. She is also on Duloxetine 60 mg BID. I have advised the patient that if her symptoms continue to persist, she is to contact her PCP and see a neurologist in the near future. Patient is agreeable. She will follow up at our office in 4 weeks for reevaluation. She will call barring any issues.  I have consulted the  registry for the purpose of reviewing the patient's controlled substance.   I explained the risk of addiction, tolerance and withdrawals and  advised the patient they must keep their medication under a lock and key, or in a safe place away from children or other individuals. This medication given is solely for the patient and under no circumstances to be shared. Patient verbalized this and is in agreement with the aforementioned. I explained the risk of addiction, tolerance, and withdrawals. UDS will be done randomly and a drug agreement was signed.  The patient will return to the office in 4 weeks' time and is aware to contact me with any question or concerns in the interim.  DANO Hernandez MD

## 2024-03-28 ENCOUNTER — RX RENEWAL (OUTPATIENT)
Age: 74
End: 2024-03-28

## 2024-03-28 RX ORDER — CELECOXIB 200 MG/1
200 CAPSULE ORAL TWICE DAILY
Qty: 60 | Refills: 1 | Status: ACTIVE | COMMUNITY
Start: 2024-02-27 | End: 1900-01-01

## 2024-04-24 ENCOUNTER — APPOINTMENT (OUTPATIENT)
Dept: PAIN MANAGEMENT | Facility: CLINIC | Age: 74
End: 2024-04-24

## 2024-05-01 NOTE — ASU PREOP CHECKLIST - PATIENT SENT AT
Left VM for pt to inform her of good lab results. Since I am unable to reach her, I will send a copy to her active portal account.     She can respond to portal or call me with any additional questions.    15-Feb-2019 13:17

## 2024-05-13 ENCOUNTER — RX RENEWAL (OUTPATIENT)
Age: 74
End: 2024-05-13

## 2024-05-13 RX ORDER — MELOXICAM 15 MG/1
15 TABLET ORAL DAILY
Qty: 30 | Refills: 1 | Status: ACTIVE | COMMUNITY
Start: 2024-03-20 | End: 1900-01-01

## 2024-05-28 ENCOUNTER — APPOINTMENT (OUTPATIENT)
Dept: ORTHOPEDIC SURGERY | Facility: CLINIC | Age: 74
End: 2024-05-28

## 2024-07-10 ENCOUNTER — RX RENEWAL (OUTPATIENT)
Age: 74
End: 2024-07-10

## 2024-08-14 ENCOUNTER — EMERGENCY (EMERGENCY)
Facility: HOSPITAL | Age: 74
LOS: 0 days | Discharge: HOME | End: 2024-08-14
Attending: EMERGENCY MEDICINE
Payer: MEDICARE

## 2024-08-14 VITALS
HEART RATE: 77 BPM | OXYGEN SATURATION: 97 % | WEIGHT: 169.98 LBS | RESPIRATION RATE: 18 BRPM | TEMPERATURE: 98 F | SYSTOLIC BLOOD PRESSURE: 135 MMHG | DIASTOLIC BLOOD PRESSURE: 83 MMHG | HEIGHT: 65 IN

## 2024-08-14 DIAGNOSIS — Z90.710 ACQUIRED ABSENCE OF BOTH CERVIX AND UTERUS: Chronic | ICD-10-CM

## 2024-08-14 DIAGNOSIS — S63.502A UNSPECIFIED SPRAIN OF LEFT WRIST, INITIAL ENCOUNTER: ICD-10-CM

## 2024-08-14 DIAGNOSIS — Z98.1 ARTHRODESIS STATUS: Chronic | ICD-10-CM
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
## 2024-08-14 DIAGNOSIS — S09.93XA UNSPECIFIED INJURY OF FACE, INITIAL ENCOUNTER: ICD-10-CM

## 2024-08-14 DIAGNOSIS — Z88.1 ALLERGY STATUS TO OTHER ANTIBIOTIC AGENTS: ICD-10-CM

## 2024-08-14 DIAGNOSIS — Z96.653 PRESENCE OF ARTIFICIAL KNEE JOINT, BILATERAL: Chronic | ICD-10-CM

## 2024-08-14 DIAGNOSIS — S09.90XA UNSPECIFIED INJURY OF HEAD, INITIAL ENCOUNTER: ICD-10-CM

## 2024-08-14 DIAGNOSIS — Z90.49 ACQUIRED ABSENCE OF OTHER SPECIFIED PARTS OF DIGESTIVE TRACT: Chronic | ICD-10-CM

## 2024-08-14 DIAGNOSIS — S05.12XA CONTUSION OF EYEBALL AND ORBITAL TISSUES, LEFT EYE, INITIAL ENCOUNTER: ICD-10-CM

## 2024-08-14 DIAGNOSIS — Z23 ENCOUNTER FOR IMMUNIZATION: ICD-10-CM
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
## 2024-08-14 DIAGNOSIS — Z04.3 ENCOUNTER FOR EXAMINATION AND OBSERVATION FOLLOWING OTHER ACCIDENT: ICD-10-CM

## 2024-08-14 PROCEDURE — 70486 CT MAXILLOFACIAL W/O DYE: CPT | Mod: 26,MC

## 2024-08-14 PROCEDURE — 73562 X-RAY EXAM OF KNEE 3: CPT | Mod: 26,LT

## 2024-08-14 PROCEDURE — 73030 X-RAY EXAM OF SHOULDER: CPT | Mod: 26,LT

## 2024-08-14 PROCEDURE — 70450 CT HEAD/BRAIN W/O DYE: CPT | Mod: 26,MC

## 2024-08-14 PROCEDURE — 99285 EMERGENCY DEPT VISIT HI MDM: CPT

## 2024-08-14 PROCEDURE — 99284 EMERGENCY DEPT VISIT MOD MDM: CPT | Mod: 25

## 2024-08-14 PROCEDURE — 90715 TDAP VACCINE 7 YRS/> IM: CPT

## 2024-08-14 PROCEDURE — 73110 X-RAY EXAM OF WRIST: CPT | Mod: 26,LT

## 2024-08-14 PROCEDURE — 70486 CT MAXILLOFACIAL W/O DYE: CPT | Mod: MC

## 2024-08-14 PROCEDURE — 73030 X-RAY EXAM OF SHOULDER: CPT | Mod: LT

## 2024-08-14 PROCEDURE — 90471 IMMUNIZATION ADMIN: CPT

## 2024-08-14 PROCEDURE — 70450 CT HEAD/BRAIN W/O DYE: CPT | Mod: MC

## 2024-08-14 PROCEDURE — 72125 CT NECK SPINE W/O DYE: CPT | Mod: MC

## 2024-08-14 PROCEDURE — 73110 X-RAY EXAM OF WRIST: CPT | Mod: LT

## 2024-08-14 PROCEDURE — 73562 X-RAY EXAM OF KNEE 3: CPT | Mod: LT

## 2024-08-14 PROCEDURE — 72125 CT NECK SPINE W/O DYE: CPT | Mod: 26,MC

## 2024-08-14 RX ORDER — CLOSTRIDIUM TETANI TOXOID ANTIGEN (FORMALDEHYDE INACTIVATED), CORYNEBACTERIUM DIPHTHERIAE TOXOID ANTIGEN (FORMALDEHYDE INACTIVATED), BORDETELLA PERTUSSIS TOXOID ANTIGEN (GLUTARALDEHYDE INACTIVATED), BORDETELLA PERTUSSIS FILAMENTOUS HEMAGGLUTININ ANTIGEN (FORMALDEHYDE INACTIVATED), BORDETELLA PERTUSSIS PERTACTIN ANTIGEN, AND BORDETELLA PERTUSSIS FIMBRIAE 2/3 ANTIGEN 5; 2; 2.5; 5; 3; 5 [LF]/.5ML; [LF]/.5ML; UG/.5ML; UG/.5ML; UG/.5ML; UG/.5ML
0.5 INJECTION, SUSPENSION INTRAMUSCULAR ONCE
Refills: 0 | Status: COMPLETED | OUTPATIENT
Start: 2024-08-14 | End: 2024-08-14

## 2024-08-14 RX ADMIN — CLOSTRIDIUM TETANI TOXOID ANTIGEN (FORMALDEHYDE INACTIVATED), CORYNEBACTERIUM DIPHTHERIAE TOXOID ANTIGEN (FORMALDEHYDE INACTIVATED), BORDETELLA PERTUSSIS TOXOID ANTIGEN (GLUTARALDEHYDE INACTIVATED), BORDETELLA PERTUSSIS FILAMENTOUS HEMAGGLUTININ ANTIGEN (FORMALDEHYDE INACTIVATED), BORDETELLA PERTUSSIS PERTACTIN ANTIGEN, AND BORDETELLA PERTUSSIS FIMBRIAE 2/3 ANTIGEN 0.5 MILLILITER(S): 5; 2; 2.5; 5; 3; 5 INJECTION, SUSPENSION INTRAMUSCULAR at 19:08

## 2024-08-14 NOTE — ED PROVIDER NOTE - PATIENT PORTAL LINK FT
You can access the FollowMyHealth Patient Portal offered by [facility] by registering at the following website: [email or web address]. By joining [facility]’s FollowMyHealth portal, you will also be able to view your health information using other applications (apps) compatible with our system.

## 2024-08-14 NOTE — ED ADULT TRIAGE NOTE - CHIEF COMPLAINT QUOTE
As per EMS, patient had a fall outside. C/O hematoma to left orbital region and pain/swelling to left wrist. Denies LOC

## 2024-08-14 NOTE — ED PROVIDER NOTE - ATTENDING APP SHARED VISIT CONTRIBUTION OF CARE
I have personally performed a history and physical exam on this patient and personally directed the management of the patient.Patient is a 74-year-old female presents to the emergency department status post fall no LOC no vomiting patient was able to stand up and ambulate without any pain to the lower extremities or hips any oral anticoagulation patient denies any chest pain shortness of breath abdominal pain     normocephalic patient has superficial lacerations to the forehead no Velasquez sign raccoon eyes time septal hematoma no tenderness palpation to the CT or L-spine chest clear to auscultation bilaterally abdomen soft nontender nondistended bowel sounds positive no guarding no rebound no tenderness to palpation of the pelvis bilateral femurs pedal pulses 2+ capillary refills normal    Assessment plan we obtained images of x-ray of the wrist which reveals no acute fractures per my independent evaluation x-ray of the knee reveals no fractures x-ray of the shoulder reveals no evidence of fractures we obtained CT head max face and CT patient improved she has a normal neurologic exam at this point stable for discharge patient is ambulating at baseline I have personally performed a history and physical exam on this patient and personally directed the management of the patient.Patient is a 74-year-old female presents to the emergency department status post fall no LOC no vomiting patient was able to stand up and ambulate without any pain to the lower extremities or hips any oral anticoagulation patient denies any chest pain shortness of breath abdominal pain     normocephalic patient has superficial lacerations to the forehead no Velasquez sign raccoon eyes time septal hematoma no tenderness palpation to the CT or L-spine chest clear to auscultation bilaterally abdomen soft nontender nondistended bowel sounds positive no guarding no rebound no tenderness to palpation of the pelvis bilateral femurs pedal pulses 2+ capillary refills normal    Assessment plan we obtained images of x-ray of the wrist which reveals no acute fractures per my independent evaluation x-ray of the knee reveals no fractures x-ray of the shoulder reveals no evidence of fractures we obtained CT head max face and CT patient improved she has a normal neurologic exam at this point stable for discharge patient is ambulating at baseline.

## 2024-08-14 NOTE — ED PROVIDER NOTE - CARE PLAN
1 Principal Discharge DX:	Fall  Secondary Diagnosis:	CHI (closed head injury)  Secondary Diagnosis:	Facial trauma  Secondary Diagnosis:	Left wrist sprain

## 2024-08-14 NOTE — ED PROVIDER NOTE - OBJECTIVE STATEMENT
.Patient is a 74-year-old female presents to the emergency department status post fall no LOC no vomiting patient was able to stand up and ambulate without any pain to the lower extremities or hips any oral anticoagulation patient denies any chest pain shortness of breath abdominal pain

## 2024-08-14 NOTE — ED PROVIDER NOTE - CARE PROVIDER_API CALL
Merritt Sutherland  Orthopaedic Surgery  [address] [place] [address]  Phone: [phone]  Fax: [phone]  Follow Up Time:

## 2024-08-14 NOTE — ED PROVIDER NOTE - CLINICAL SUMMARY MEDICAL DECISION MAKING FREE TEXT BOX
Patient is a 74-year-old female presents to the emergency department status post fall no LOC no vomiting patient was able to stand up and ambulate without any pain to the lower extremities or hips any oral anticoagulation patient denies any chest pain shortness of breath abdominal pain     normocephalic patient has superficial lacerations to the forehead no Velasquez sign raccoon eyes time septal hematoma no tenderness palpation to the CT or L-spine chest clear to auscultation bilaterally abdomen soft nontender nondistended bowel sounds positive no guarding no rebound no tenderness to palpation of the pelvis bilateral femurs pedal pulses 2+ capillary refills normal    Assessment plan we obtained images of x-ray of the wrist which reveals no acute fractures per my independent evaluation x-ray of the knee reveals no fractures x-ray of the shoulder reveals no evidence of fractures we obtained CT head max face and CT patient improved she has a normal neurologic exam at this point stable for discharge patient is ambulating at baseline

## 2024-09-09 NOTE — ED PROVIDER NOTE - CONDITION AT DISCHARGE:
Improved Plan: - d/c dapsone 7.5 % topical gel with pump\\n- d/c tretinoin 0.025 % topical cream\\nAM\\n- gentle cleanser or SA wash\\n- start azelaic acid 15 % topical gel: Apply pea size amount to face every AM.\\n- non-comedogenic moisturizer with spf 30+\\nPM\\n-gentle cleanser or SA wash\\n- recommended moisturizing before applying rx\\n- start adapalene 0.3 % topical gel: Apply pea size amount to entire face every other night, gradually increase to every night as tolerated.\\n- non-comedogenic moisturizer. Detail Level: Zone Render In Strict Bullet Format?: No

## 2025-04-14 NOTE — PRE-ANESTHESIA EVALUATION ADULT - WEIGHT IN KG
Subjective   Patient ID: Silvia is a 49 year old female.    Chief Complaint   Patient presents with    Office Visit      Pre op- Surgery date 5/2/25 with Dr Nancy Rendon at [facility] on the right foot      This is a 49 years old female patient who was sent to me from Dr. Twin Mcconnell's office for medical clearance before patient undergoes right hallux osteotomy.  Patient had surgery done in the past patient is having difficulty walking and there is deformity of the right big toe patient is scheduled to have surgery on May 2.  Patient has history of asthma hide been using inhalers regularly patient takes Singulair only as needed patient states she has not been taking phentermine.  Patient takes valacyclovir as needed.  Patient is allergic to erythromycin and strawberry patient did receive 1 COVID-vaccine and 1 booster.  Past surgical history of 3 C-sections cholecystectomy right foot surgery left rotator cuff repair and partial hysterectomy.  Social history does not smoke drinks alcohol socially again and does exercise occasionally patient had an eye exam 3 weeks ago patient wears glasses          Review of Systems   Constitutional: Negative.    HENT: Negative.          Can smell and taste food sinus congestion postnasal drip and cough patient is using Flonase and Singulair and albuterol inhaler denies any chills or fever   Eyes:         Patient wears glasses recently had an eye exam   Respiratory:          Clear no wheezing history of asthma using Symbicort regularly using albuterol only as needed non-smoker   Cardiovascular: Negative.         Regular S1-S2 present no chest pain blood pressure 120/70   Gastrointestinal: Negative.         Patient recently had a colonoscopy in 2023 which shows polyp and she is advised to have a repeat colonoscopy in 7 to 10 years by gastroenterology   Endocrine: Negative.    Genitourinary:         Partial hysterectomy   Musculoskeletal:         Patient has chronic right  back pain and right hip pain has seen pain management did receive 2 epidural injections with some improvement patient is ambulatory patient advised to continue follow-up with pain management    Skin: Negative.    Neurological: Negative.    Hematological: Negative.    Psychiatric/Behavioral: Negative.     All other systems reviewed and are negative.    Objective   Physical Exam  Vitals and nursing note reviewed.   Constitutional:       Appearance: Normal appearance.   HENT:      Head: Normocephalic and atraumatic.      Comments: Sinus congestion postnasal drip cough clear mucus no chills no fever can smell and taste food did not get COVID-vaccine     Nose: Nose normal.      Comments: Patient can smell and taste sinus congestion postnasal drip occasional cough no mucus can smell and taste food did receive COVID-vaccine     Neck: Normal range of motion and neck supple.   Eyes:      Extraocular Movements: Extraocular movements intact.      Pupils: Pupils are equal, round, and reactive to light.      Comments: Patient wears glasses last eye exam yields year and a half ago   Cardiovascular:      Rate and Rhythm: Normal rate and regular rhythm.      Comments: Regular S1-S2 present no chest pain pressure 101/64 EKG was done in the office which shows sinus arrhythmia with first-degree AV block stable  Pulmonary:      Effort: Pulmonary effort is normal.      Breath sounds: Normal breath sounds.      Comments: Clear no wheezing no shortness of breath or cough patient using Symbicort regularly use albuterol only as needed  Abdominal:      Palpations: Abdomen is soft.      Comments: Soft no masses no tenderness bowel sounds and bowel movements are normal patient had a colonoscopy which shows polyp she is advised by GI for repeat colonoscopy in 7 to 10 years   Genitourinary:     Comments: Partial hysterectomy  Musculoskeletal:      Comments: Stable back pain patient is ambulatory scheduled to have surgery on her right foot    Skin:     General: Skin is warm.   Neurological:      General: No focal deficit present.      Mental Status: She is alert.   Psychiatric:         Mood and Affect: Mood normal.       Assessment   Problem List Items Addressed This Visit          Health Encounters    Preop examination - Primary       Infectious Diseases    Genital herpes simplex       Pulmonary and Pneumonias    Moderate persistent asthma without complication (CMD)   Plan  EKG was done in the office which shows nonspecific changes with first-degree AV block  CBC BMP has been ordered  Patient to continue present inhalers  Patient is advised not to take NSAIDs 1 week before the procedure  Patient will be cleared for surgery after reviewing the blood report   86.2

## 2025-07-21 NOTE — ASU PATIENT PROFILE, ADULT - FALL HARM RISK TYPE OF ASSESSMENT
Problem: Pain  Goal: Acceptable pain level achieved/maintained at rest using appropriate pain scale for the patient  Outcome: Monitoring/Evaluating progress  Goal: Acceptable pain level achieved/maintained with activity using appropriate pain scale for the patient  Outcome: Monitoring/Evaluating progress  Goal: Acceptable pain level achieved/maintained without oversedation  Outcome: Monitoring/Evaluating progress     Problem: Skin Integrity Alteration  Goal: Skin remains intact with no new/deterioration of wound or pressure injury  Outcome: Monitoring/Evaluating progress  Goal: Participates in wound care activities  Outcome: Monitoring/Evaluating progress      Admission